# Patient Record
Sex: MALE | Race: WHITE | NOT HISPANIC OR LATINO | Employment: OTHER | URBAN - METROPOLITAN AREA
[De-identification: names, ages, dates, MRNs, and addresses within clinical notes are randomized per-mention and may not be internally consistent; named-entity substitution may affect disease eponyms.]

---

## 2017-01-30 ENCOUNTER — OFFICE VISIT (OUTPATIENT)
Dept: INTERNAL MEDICINE | Facility: CLINIC | Age: 52
End: 2017-01-30
Payer: MEDICARE

## 2017-01-30 VITALS
HEIGHT: 72 IN | HEART RATE: 87 BPM | OXYGEN SATURATION: 96 % | DIASTOLIC BLOOD PRESSURE: 76 MMHG | SYSTOLIC BLOOD PRESSURE: 120 MMHG | BODY MASS INDEX: 30.28 KG/M2 | TEMPERATURE: 97 F | WEIGHT: 223.56 LBS

## 2017-01-30 DIAGNOSIS — I25.119 CORONARY ARTERY DISEASE INVOLVING NATIVE CORONARY ARTERY OF NATIVE HEART WITH ANGINA PECTORIS: ICD-10-CM

## 2017-01-30 DIAGNOSIS — Z00.00 PREVENTATIVE HEALTH CARE: ICD-10-CM

## 2017-01-30 DIAGNOSIS — R42 DIZZINESS: Primary | ICD-10-CM

## 2017-01-30 DIAGNOSIS — Z12.5 ENCOUNTER FOR SCREENING FOR MALIGNANT NEOPLASM OF PROSTATE: ICD-10-CM

## 2017-01-30 DIAGNOSIS — Z11.59 NEED FOR HEPATITIS C SCREENING TEST: ICD-10-CM

## 2017-01-30 DIAGNOSIS — F31.9 BIPOLAR AFFECTIVE DISORDER, REMISSION STATUS UNSPECIFIED: ICD-10-CM

## 2017-01-30 PROBLEM — T85.528A: Status: ACTIVE | Noted: 2017-01-30

## 2017-01-30 PROCEDURE — 99999 PR PBB SHADOW E&M-NEW PATIENT-LVL III: CPT | Mod: PBBFAC,,, | Performed by: INTERNAL MEDICINE

## 2017-01-30 PROCEDURE — 99204 OFFICE O/P NEW MOD 45 MIN: CPT | Mod: S$GLB,,, | Performed by: INTERNAL MEDICINE

## 2017-01-30 RX ORDER — LURASIDONE HYDROCHLORIDE 40 MG/1
1 TABLET, FILM COATED ORAL NIGHTLY
COMMUNITY
Start: 2017-01-23 | End: 2021-09-03

## 2017-01-30 RX ORDER — CLOPIDOGREL BISULFATE 75 MG/1
1 TABLET ORAL DAILY
COMMUNITY
Start: 2016-12-22

## 2017-01-30 RX ORDER — METOPROLOL TARTRATE 25 MG/1
1 TABLET, FILM COATED ORAL 2 TIMES DAILY
COMMUNITY
Start: 2016-12-22

## 2017-01-30 RX ORDER — FAMOTIDINE 20 MG/1
TABLET, FILM COATED ORAL
COMMUNITY
Start: 2016-11-23 | End: 2017-01-30

## 2017-01-30 RX ORDER — VORTIOXETINE 20 MG/1
1 TABLET, FILM COATED ORAL DAILY
COMMUNITY
Start: 2016-12-26

## 2017-01-30 RX ORDER — ASPIRIN 81 MG/1
81 TABLET ORAL DAILY
COMMUNITY

## 2017-01-30 RX ORDER — DICLOFENAC SODIUM 10 MG/G
GEL TOPICAL
COMMUNITY
Start: 2016-11-23 | End: 2017-01-30

## 2017-01-30 RX ORDER — IBUPROFEN 800 MG/1
TABLET ORAL
COMMUNITY
Start: 2016-11-23 | End: 2017-01-30

## 2017-01-30 RX ORDER — TRAMADOL HYDROCHLORIDE 50 MG/1
1 TABLET ORAL 2 TIMES DAILY
COMMUNITY
Start: 2017-01-16 | End: 2017-09-05

## 2017-01-30 RX ORDER — TRIHEXYPHENIDYL HYDROCHLORIDE 2 MG/1
1 TABLET ORAL NIGHTLY
COMMUNITY
Start: 2017-01-26 | End: 2021-09-03

## 2017-01-30 RX ORDER — TRAZODONE HYDROCHLORIDE 100 MG/1
25 TABLET ORAL NIGHTLY
COMMUNITY
Start: 2016-12-21 | End: 2021-09-03

## 2017-01-30 RX ORDER — MECLIZINE HYDROCHLORIDE 25 MG/1
25 TABLET ORAL EVERY 6 HOURS PRN
Qty: 30 TABLET | Refills: 0 | Status: SHIPPED | OUTPATIENT
Start: 2017-01-30 | End: 2021-09-03

## 2017-01-30 RX ORDER — ISOSORBIDE MONONITRATE 30 MG/1
1 TABLET, EXTENDED RELEASE ORAL DAILY
COMMUNITY
Start: 2016-12-22 | End: 2021-09-03

## 2017-01-30 NOTE — MR AVS SNAPSHOT
[facility]  [address] [facility] [place] [address]  Phone: [phone]  Fax: [phone]                  Huma Ronquillo   2017 4:40 PM   Office Visit    Description:  Male : 1965   Provider:  Bruno Marquis MD   Department:  [facility]           Reason for Visit     Dizziness           Diagnoses this Visit        Comments    Dizziness    -  Primary     Bipolar affective disorder, remission status unspecified         Coronary artery disease involving native coronary artery of native heart with angina pectoris         Preventative health care         Need for hepatitis C screening test         Encounter for screening for malignant neoplasm of prostate                To Do List           Future Appointments        Provider Department Dept Phone    2017 8:00 AM LABORATORY, [facility] (5 Years of Data)     None      Follow-Up and Disposition     Return if symptoms worsen or fail to improve.       These Medications        Disp Refills Start End    meclizine (ANTIVERT) 25 mg tablet 30 tablet 0 2017    Take 1 tablet (25 mg total) by mouth every 6 (six) hours as needed for Dizziness or Nausea. - Oral    Pharmacy: [id number] - [facility] [address] AT [facility] [address] Ph #: [phone]         [facility]     [facility] On Call Nurse Care Line -  Assistance  Registered nurses in the [facility] On Call Center provide clinical advisement, health education, appointment booking, and other advisory services.  Call for this free service at [phone].             Medications           Message regarding Medications     Verify the changes and/or additions to your medication regime listed below are the same as discussed with your clinician today.  If any of these changes or additions are incorrect, please notify your healthcare provider.        START taking these NEW medications         Refills    meclizine (ANTIVERT) 25 mg tablet 0    Sig: Take 1 tablet (25 mg total) by mouth every 6 (six) hours as needed for Dizziness or Nausea.    Class: Normal    Route: Oral      STOP taking these medications     famotidine (PEPCID) 20 MG tablet     VOLTAREN 1 % Gel     ibuprofen (ADVIL,MOTRIN) 800 MG tablet            Verify that the below list of medications is an accurate representation of the medications you are currently taking.  If none reported, the list may be blank. If incorrect, please contact your healthcare provider. Carry this list with you in case of emergency.           Current Medications     aspirin (ECOTRIN) 81 MG EC tablet Take 81 mg by mouth once daily.    clopidogrel (PLAVIX) 75 mg tablet Take 1 tablet by mouth once daily.    isosorbide mononitrate (IMDUR) 30 MG 24 hr tablet Take 1 tablet by mouth once daily.    LATUDA 40 mg Tab tablet Take 1 tablet by mouth every evening.    meclizine (ANTIVERT) 25 mg tablet Take 1 tablet (25 mg total) by mouth every 6 (six) hours as needed for Dizziness or Nausea.    metoprolol tartrate (LOPRESSOR) 25 MG tablet Take 1 tablet by mouth 2 (two) times daily.    tramadol (ULTRAM) 50 mg tablet Take 1 tablet by mouth 2 (two) times daily.    trazodone (DESYREL) 100 MG tablet Take 25 mg by mouth every evening.    trihexyphenidyl (ARTANE) 2 MG tablet Take 1 tablet by mouth every evening.    TRINTELLIX 20 mg Tab Take 1 tablet by mouth once daily.           Clinical Reference Information           Vital Signs - Last Recorded  Most recent update: 1/30/2017  4:26 PM by Ofe Knight MA    BP Pulse Temp Ht Wt SpO2    120/76 (BP Location: Right arm, Patient Position: Sitting) 87 96.9 °F (36.1 °C) (Tympanic) 6' (1.829 m) 101.4 kg (223 lb 8.7 oz) 96%    BMI                30.32 kg/m2          Blood Pressure          Most Recent Value    BP  120/76      Allergies as of 1/30/2017     Benadryl [Diphenhydramine Hcl]    Codeine      Immunizations Administered on Date of  Encounter - 1/30/2017     None      Orders Placed During Today's Visit     Future Labs/Procedures Expected by Expires    CBC auto differential  1/30/2017 3/31/2018    Comprehensive metabolic panel  1/30/2017 3/31/2018    Hepatitis C antibody  1/30/2017 3/31/2018    Lipid panel  1/30/2017 3/31/2018    PSA, Screening  1/30/2017 3/31/2018      [facility] Sign-Up     Activating your [facility] account is as easy as 1-2-3!     1) Visit [email or web address], select Sign Up Now, enter this activation code and your date of birth, then select Next.  [email or web address]  Expires: 3/16/2017  5:03 PM      2) Create a username and password to use when you visit [facility] in the future and select a security question in case you lose your password and select Next.    3) Enter your e-mail address and click Sign Up!    Additional Information  If you have questions, please e-mail [email or web address] or call [phone] to talk to our [facility] staff. Remember, [facility] is NOT to be used for urgent needs. For medical emergencies, dial 911.

## 2017-01-30 NOTE — PROGRESS NOTES
Subjective:      Patient ID: Huma Ronquillo is a 51 y.o. male.    Chief Complaint: Dizziness (x 1 week)    HPI Comments: 52 yo with Past Medical History:    Bipolar disorder                                              Cardiovascular disease                                        Depression       Here today c/o dizziness. Pt also requests check up labs.     Cards: leroy       Past Surgical History:    ROTATOR CUFF REPAIR                             Right 2013          TIBIA FRACTURE SURGERY                          Left               COLONOSCOPY                                      2016          Social History    Marital status:              Spouse name:                       Years of education:                 Number of children:               Occupational History    None on file    Social History Main Topics    Smoking status: Never Smoker                                                                Smokeless status: Former User                       Alcohol use: No              Drug use: No              Sexual activity: Not on file          Other Topics            Concern    None on file    Social History Narrative    None on file        family history includes Cancer in his mother; Heart attack in his father.                                                        Dizziness:   Chronicity:  New  Onset:  In the past 7 days  Progression since onset:  Unchanged  Frequency:  Every few hours  Severity:  Moderate  Duration:  1 minute  Dizziness characteristics:  Spinning inside head only  Frequency of Spells:  Daily   Associated symptoms: light-headedness.no hearing loss, no fever, no tinnitus, no nausea, no vomiting, no diaphoresis, no weakness, no visual disturbances, no syncope, no palpitations, no facial weakness, no slurred speech, no numbness in extremities and no chest pain.  Aggravated by:  Getting up and position changes  Treatments tried:  Nothing  Improvements on treatment:  No relief   PMH  includes: anxiety.no strokes, no neurologic disease, no head trauma and no head trauma.    Review of Systems   Constitutional: Negative for diaphoresis and fever.   HENT: Negative for hearing loss and tinnitus.    Cardiovascular: Negative for chest pain, palpitations and syncope.   Gastrointestinal: Negative for nausea and vomiting.   Neurological: Positive for dizziness and light-headedness. Negative for weakness.     Objective:     Visit Vitals    /76 (BP Location: Right arm, Patient Position: Sitting)    Pulse 87    Temp 96.9 °F (36.1 °C) (Tympanic)    Ht 6' (1.829 m)    Wt 101.4 kg (223 lb 8.7 oz)    SpO2 96%    BMI 30.32 kg/m2       Physical Exam   Constitutional: He is oriented to person, place, and time. He appears well-developed and well-nourished. No distress.   HENT:   Head: Normocephalic and atraumatic.   Right Ear: Tympanic membrane normal.   Left Ear: Tympanic membrane normal.   Mouth/Throat: Oropharynx is clear and moist.   Eyes: EOM are normal. Pupils are equal, round, and reactive to light.   Neck: Neck supple. No thyromegaly present.   Cardiovascular: Normal rate and regular rhythm.    Pulmonary/Chest: Breath sounds normal. He has no wheezes. He has no rales.   Abdominal: Soft. Bowel sounds are normal. There is no tenderness.   Musculoskeletal: He exhibits no edema.   Lymphadenopathy:     He has no cervical adenopathy.   Neurological: He is alert and oriented to person, place, and time. He displays normal reflexes. No cranial nerve deficit. Coordination normal.   Symptoms reproduced with Shaquille-Hallpike maneuver, worse on left.   Skin: Skin is warm and dry.   Psychiatric: He has a normal mood and affect. His behavior is normal.       Assessment:     1. Dizziness    2. Bipolar affective disorder, remission status unspecified    3. Coronary artery disease involving native coronary artery of native heart with angina pectoris    4. Preventative health care    5. Need for hepatitis C screening  test    6. Encounter for screening for malignant neoplasm of prostate       Plan:   Dizziness  Denies new meds and truama  -     Orthostatic blood pressures normal, see chart for results    Bipolar affective disorder, remission status unspecified  Reports stable  Cont meds and f/u as per psyc    Coronary artery disease involving native coronary artery of native heart with angina pectoris  -     Lipid panel; Future; Expected date: 1/30/17  -     CBC auto differential; Future; Expected date: 1/30/17  -     Comprehensive metabolic panel; Future; Expected date: 1/30/17    Preventative health care  -     PSA, Screening; Future; Expected date: 1/30/17    Need for hepatitis C screening test  -     Hepatitis C antibody; Future; Expected date: 1/30/17    Encounter for screening for malignant neoplasm of prostate   -     PSA, Screening; Future; Expected date: 1/30/17    Other orders  -     meclizine (ANTIVERT) 25 mg tablet; Take 1 tablet (25 mg total) by mouth every 6 (six) hours as needed for Dizziness or Nausea.  Dispense: 30 tablet; Refill: 0    vertigo exercises as discussed  Stay hydrated        Return in about 4 weeks (around 2/27/2017), or if symptoms worsen or fail to improve.

## 2017-02-01 ENCOUNTER — LAB VISIT (OUTPATIENT)
Dept: LAB | Facility: HOSPITAL | Age: 52
End: 2017-02-01
Attending: INTERNAL MEDICINE
Payer: MEDICARE

## 2017-02-01 DIAGNOSIS — Z12.5 ENCOUNTER FOR SCREENING FOR MALIGNANT NEOPLASM OF PROSTATE: ICD-10-CM

## 2017-02-01 DIAGNOSIS — I25.119 CORONARY ARTERY DISEASE INVOLVING NATIVE CORONARY ARTERY OF NATIVE HEART WITH ANGINA PECTORIS: ICD-10-CM

## 2017-02-01 DIAGNOSIS — Z11.59 NEED FOR HEPATITIS C SCREENING TEST: ICD-10-CM

## 2017-02-01 DIAGNOSIS — Z00.00 PREVENTATIVE HEALTH CARE: ICD-10-CM

## 2017-02-01 LAB
ALBUMIN SERPL BCP-MCNC: 4.1 G/DL
ALP SERPL-CCNC: 79 U/L
ALT SERPL W/O P-5'-P-CCNC: 75 U/L
ANION GAP SERPL CALC-SCNC: 10 MMOL/L
AST SERPL-CCNC: 61 U/L
BASOPHILS # BLD AUTO: 0.03 K/UL
BASOPHILS NFR BLD: 0.6 %
BILIRUB SERPL-MCNC: 0.8 MG/DL
BUN SERPL-MCNC: 10 MG/DL
CALCIUM SERPL-MCNC: 10 MG/DL
CHLORIDE SERPL-SCNC: 104 MMOL/L
CHOLEST/HDLC SERPL: 4.4 {RATIO}
CO2 SERPL-SCNC: 24 MMOL/L
COMPLEXED PSA SERPL-MCNC: 0.75 NG/ML
CREAT SERPL-MCNC: 1.1 MG/DL
DIFFERENTIAL METHOD: ABNORMAL
EOSINOPHIL # BLD AUTO: 0.1 K/UL
EOSINOPHIL NFR BLD: 2.4 %
ERYTHROCYTE [DISTWIDTH] IN BLOOD BY AUTOMATED COUNT: 13.1 %
EST. GFR  (AFRICAN AMERICAN): >60 ML/MIN/1.73 M^2
EST. GFR  (NON AFRICAN AMERICAN): >60 ML/MIN/1.73 M^2
GLUCOSE SERPL-MCNC: 108 MG/DL
HCT VFR BLD AUTO: 47.2 %
HDL/CHOLESTEROL RATIO: 22.7 %
HDLC SERPL-MCNC: 194 MG/DL
HDLC SERPL-MCNC: 44 MG/DL
HGB BLD-MCNC: 16.8 G/DL
LDLC SERPL CALC-MCNC: 125.6 MG/DL
LYMPHOCYTES # BLD AUTO: 1.8 K/UL
LYMPHOCYTES NFR BLD: 35.5 %
MCH RBC QN AUTO: 32.9 PG
MCHC RBC AUTO-ENTMCNC: 35.6 %
MCV RBC AUTO: 92 FL
MONOCYTES # BLD AUTO: 0.6 K/UL
MONOCYTES NFR BLD: 11.8 %
NEUTROPHILS # BLD AUTO: 2.4 K/UL
NEUTROPHILS NFR BLD: 49.5 %
NONHDLC SERPL-MCNC: 150 MG/DL
PLATELET # BLD AUTO: 206 K/UL
PMV BLD AUTO: 10.1 FL
POTASSIUM SERPL-SCNC: 4.8 MMOL/L
PROT SERPL-MCNC: 7.8 G/DL
RBC # BLD AUTO: 5.11 M/UL
SODIUM SERPL-SCNC: 138 MMOL/L
TRIGL SERPL-MCNC: 122 MG/DL
WBC # BLD AUTO: 4.93 K/UL

## 2017-02-01 PROCEDURE — 36415 COLL VENOUS BLD VENIPUNCTURE: CPT | Mod: PO

## 2017-02-01 PROCEDURE — 84153 ASSAY OF PSA TOTAL: CPT

## 2017-02-01 PROCEDURE — 80061 LIPID PANEL: CPT

## 2017-02-01 PROCEDURE — 80053 COMPREHEN METABOLIC PANEL: CPT

## 2017-02-01 PROCEDURE — 86803 HEPATITIS C AB TEST: CPT

## 2017-02-01 PROCEDURE — 85025 COMPLETE CBC W/AUTO DIFF WBC: CPT

## 2017-02-02 LAB — HCV AB SERPL QL IA: POSITIVE

## 2017-02-03 ENCOUNTER — TELEPHONE (OUTPATIENT)
Dept: INTERNAL MEDICINE | Facility: CLINIC | Age: 52
End: 2017-02-03

## 2017-02-03 ENCOUNTER — PATIENT MESSAGE (OUTPATIENT)
Dept: INTERNAL MEDICINE | Facility: CLINIC | Age: 52
End: 2017-02-03

## 2017-02-03 DIAGNOSIS — R76.8 HEPATITIS C ANTIBODY TEST POSITIVE: ICD-10-CM

## 2017-02-03 DIAGNOSIS — R74.8 ELEVATED LIVER ENZYMES: Primary | ICD-10-CM

## 2017-02-03 NOTE — TELEPHONE ENCOUNTER
Labs are ok except liver enzymes are mildly elevated and hep c ab pos. Needs hep c RNA to see if he truly has hep c and also needs liver ultrasound.

## 2017-02-03 NOTE — TELEPHONE ENCOUNTER
Notified pt and his wife that liver enzymes were mildly elevated and hep c ab was positive with recommendation for liver ultrasound and additional testing for hep c.  Pt stated he has a gastroenterologist, Dr. Sameer Woodruff, who checks all this for him and does not want any of the tests scheduled here.

## 2017-02-06 ENCOUNTER — PATIENT MESSAGE (OUTPATIENT)
Dept: INTERNAL MEDICINE | Facility: CLINIC | Age: 52
End: 2017-02-06

## 2017-02-09 ENCOUNTER — HOSPITAL ENCOUNTER (OUTPATIENT)
Dept: RADIOLOGY | Facility: HOSPITAL | Age: 52
Discharge: HOME | End: 2017-02-09
Attending: INTERNAL MEDICINE
Payer: MEDICARE

## 2017-02-09 DIAGNOSIS — R74.8 ELEVATED LIVER ENZYMES: ICD-10-CM

## 2017-02-09 PROCEDURE — 76705 ECHO EXAM OF ABDOMEN: CPT | Mod: 26,,, | Performed by: RADIOLOGY

## 2017-02-09 PROCEDURE — 76705 ECHO EXAM OF ABDOMEN: CPT | Mod: TC,PO

## 2017-02-13 ENCOUNTER — OFFICE VISIT (OUTPATIENT)
Dept: INTERNAL MEDICINE | Facility: CLINIC | Age: 52
End: 2017-02-13
Payer: MEDICARE

## 2017-02-13 VITALS
WEIGHT: 226.63 LBS | DIASTOLIC BLOOD PRESSURE: 82 MMHG | SYSTOLIC BLOOD PRESSURE: 124 MMHG | OXYGEN SATURATION: 96 % | HEIGHT: 72 IN | BODY MASS INDEX: 30.7 KG/M2 | HEART RATE: 80 BPM | TEMPERATURE: 97 F

## 2017-02-13 DIAGNOSIS — B18.2 CHRONIC HEPATITIS C WITHOUT HEPATIC COMA: ICD-10-CM

## 2017-02-13 DIAGNOSIS — I25.119 CORONARY ARTERY DISEASE INVOLVING NATIVE CORONARY ARTERY OF NATIVE HEART WITH ANGINA PECTORIS: ICD-10-CM

## 2017-02-13 DIAGNOSIS — Z78.9 STATIN INTOLERANCE: ICD-10-CM

## 2017-02-13 DIAGNOSIS — F31.9 BIPOLAR AFFECTIVE DISORDER, REMISSION STATUS UNSPECIFIED: ICD-10-CM

## 2017-02-13 DIAGNOSIS — Z00.00 ROUTINE GENERAL MEDICAL EXAMINATION AT A HEALTH CARE FACILITY: Primary | ICD-10-CM

## 2017-02-13 DIAGNOSIS — E66.9 OBESITY (BMI 30.0-34.9): ICD-10-CM

## 2017-02-13 PROCEDURE — 99396 PREV VISIT EST AGE 40-64: CPT | Mod: S$GLB,,, | Performed by: INTERNAL MEDICINE

## 2017-02-13 PROCEDURE — 99999 PR PBB SHADOW E&M-EST. PATIENT-LVL III: CPT | Mod: PBBFAC,,, | Performed by: INTERNAL MEDICINE

## 2017-02-13 NOTE — PROGRESS NOTES
Subjective:      Patient ID: Huma Ronquillo is a 51 y.o. male.    Chief Complaint: Follow-up    HPI Comments: 52 yo with Patient Active Problem List:     Bipolar disorder     Coronary artery disease involving native coronary artery of native heart with angina pectoris     Statin intolerance    Here today for annual prevent exam.  He is feeling well today and in his usual state of health.  He reports that his dizziness has resolved since his last visit.  Never smoked.  He reports diagnosis of hepatitis C without treatment approximately 5 years ago.  He reports history of statin intolerance due to elevated liver enzymes.    Review of Systems   Constitutional: Negative for chills and fever.   HENT: Negative for ear pain and sore throat.    Respiratory: Negative for cough.    Cardiovascular: Negative for chest pain.   Gastrointestinal: Negative for abdominal pain and blood in stool.   Genitourinary: Negative for dysuria and hematuria.   Neurological: Negative for seizures and syncope.     Objective:     Visit Vitals    /82 (BP Location: Right arm, Patient Position: Sitting)    Pulse 80    Temp 97.4 °F (36.3 °C) (Tympanic)    Ht 6' (1.829 m)    Wt 102.8 kg (226 lb 10.1 oz)    SpO2 96%    BMI 30.74 kg/m2       Physical Exam   Constitutional: He is oriented to person, place, and time. He appears well-developed and well-nourished. No distress.   HENT:   Head: Normocephalic and atraumatic.   Mouth/Throat: Oropharynx is clear and moist.   Eyes: EOM are normal. Pupils are equal, round, and reactive to light.   Neck: Neck supple. No thyromegaly present.   Cardiovascular: Normal rate and regular rhythm.    Pulmonary/Chest: Breath sounds normal. He has no wheezes. He has no rales.   Abdominal: Soft. Bowel sounds are normal. There is no tenderness.   Musculoskeletal: He exhibits no edema.   Lymphadenopathy:     He has no cervical adenopathy.   Neurological: He is alert and oriented to person, place, and time.   Skin:  Skin is warm and dry.   Psychiatric: He has a normal mood and affect. His behavior is normal.     Lab Visit on 02/09/2017   Component Date Value Ref Range Status    HCV Log 02/09/2017 5.64* <1.08 Log (10) IU/mL Final    Comment: This procedure utilizes a real-time polymerase chain   reaction test from [employer].  The amplification   target is a conserved region of the HCV genome.  The lower  limit of quantitation is 12 IU/mL (1.08 Log IU/mL) and the   upper limit of quantitation is 100 million IU/mL (8.00 Log  IU/mL). The qualitative limit of detection is 12 IU/mL   (1.08 Log IU/mL).  Specimens reported as DETECTED but <12 IU/mL contain   detectable levels of hepatitis C RNA but the viral load is  below the limit of quantitation.  A Not detected result  does not rule out infection.  Test performed at [facility],  [facility] [place]  [address]     [phone]  Akhil Sousa MD  - Medical Director      HCV, Qualitative 02/09/2017 DETECTED* Not detected IU/mL Final    HCV RNA Quant PCR 02/09/2017 433,402* <12 IU/mL Final   Lab Visit on 02/01/2017   Component Date Value Ref Range Status    PSA, SCREEN 02/01/2017 0.75  0.00 - 4.00 ng/mL Final    Comment: PSA Expected levels:  Hormonal Therapy: <0.05 ng/ml  Prostatectomy: <0.01 ng/ml  Radiation Therapy: <1.00 ng/ml      Cholesterol 02/01/2017 194  120 - 199 mg/dL Final    Comment: The National Cholesterol Education Program (NCEP) has set the  following guidelines (reference ranges) for Cholesterol:  Optimal.....................<200 mg/dL  Borderline High.............200-239 mg/dL  High........................> or = 240 mg/dL      Triglycerides 02/01/2017 122  30 - 150 mg/dL Final    Comment: The National Cholesterol Education Program (NCEP) has set the  following guidelines (reference values) for triglycerides:  Normal......................<150 mg/dL  Borderline High.............150-199 mg/dL  High........................200-499  mg/dL      HDL 02/01/2017 44  40 - 75 mg/dL Final    Comment: The National Cholesterol Education Program (NCEP) has set the  following guidelines (reference values) for HDL Cholesterol:  Low...............<40 mg/dL  Optimal...........>60 mg/dL      LDL Cholesterol 02/01/2017 125.6  63.0 - 159.0 mg/dL Final    Comment: The National Cholesterol Education Program (NCEP) has set the  following guidelines (reference values) for LDL Cholesterol:  Optimal.......................<130 mg/dL  Borderline High...............130-159 mg/dL  High..........................160-189 mg/dL  Very High.....................>190 mg/dL      HDL/Chol Ratio 02/01/2017 22.7  20.0 - 50.0 % Final    Total Cholesterol/HDL Ratio 02/01/2017 4.4  2.0 - 5.0 Final    Non-HDL Cholesterol 02/01/2017 150  mg/dL Final    Comment: Risk category and Non-HDL cholesterol goals:  Coronary heart disease (CHD)or equivalent (10-year risk of CHD >20%):  Non-HDL cholesterol goal     <130 mg/dL  Two or more CHD risk factors and 10-year risk of CHD <= 20%:  Non-HDL cholesterol goal     <160 mg/dL  0 to 1 CHD risk factor:  Non-HDL cholesterol goal     <190 mg/dL      Hepatitis C Ab 02/01/2017 Positive*  Final    WBC 02/01/2017 4.93  3.90 - 12.70 K/uL Final    RBC 02/01/2017 5.11  4.60 - 6.20 M/uL Final    Hemoglobin 02/01/2017 16.8  14.0 - 18.0 g/dL Final    Hematocrit 02/01/2017 47.2  40.0 - 54.0 % Final    MCV 02/01/2017 92  82 - 98 fL Final    MCH 02/01/2017 32.9* 27.0 - 31.0 pg Final    MCHC 02/01/2017 35.6  32.0 - 36.0 % Final    RDW 02/01/2017 13.1  11.5 - 14.5 % Final    Platelets 02/01/2017 206  150 - 350 K/uL Final    MPV 02/01/2017 10.1  9.2 - 12.9 fL Final    Gran # 02/01/2017 2.4  1.8 - 7.7 K/uL Final    Lymph # 02/01/2017 1.8  1.0 - 4.8 K/uL Final    Mono # 02/01/2017 0.6  0.3 - 1.0 K/uL Final    Eos # 02/01/2017 0.1  0.0 - 0.5 K/uL Final    Baso # 02/01/2017 0.03  0.00 - 0.20 K/uL Final    Gran% 02/01/2017 49.5  38.0 - 73.0 % Final     Lymph% 02/01/2017 35.5  18.0 - 48.0 % Final    Mono% 02/01/2017 11.8  4.0 - 15.0 % Final    Eosinophil% 02/01/2017 2.4  0.0 - 8.0 % Final    Basophil% 02/01/2017 0.6  0.0 - 1.9 % Final    Differential Method 02/01/2017 Automated   Final    Sodium 02/01/2017 138  136 - 145 mmol/L Final    Potassium 02/01/2017 4.8  3.5 - 5.1 mmol/L Final    Chloride 02/01/2017 104  95 - 110 mmol/L Final    CO2 02/01/2017 24  23 - 29 mmol/L Final    Glucose 02/01/2017 108  70 - 110 mg/dL Final    BUN, Bld 02/01/2017 10  6 - 20 mg/dL Final    Creatinine 02/01/2017 1.1  0.5 - 1.4 mg/dL Final    Calcium 02/01/2017 10.0  8.7 - 10.5 mg/dL Final    Total Protein 02/01/2017 7.8  6.0 - 8.4 g/dL Final    Albumin 02/01/2017 4.1  3.5 - 5.2 g/dL Final    Total Bilirubin 02/01/2017 0.8  0.1 - 1.0 mg/dL Final    Comment: For infants and newborns, interpretation of results should be based  on gestational age, weight and in agreement with clinical  observations.  Premature Infant recommended reference ranges:  Up to 24 hours.............<8.0 mg/dL  Up to 48 hours............<12.0 mg/dL  3-5 days..................<15.0 mg/dL  6-29 days.................<15.0 mg/dL      Alkaline Phosphatase 02/01/2017 79  55 - 135 U/L Final    AST 02/01/2017 61* 10 - 40 U/L Final    ALT 02/01/2017 75* 10 - 44 U/L Final    Anion Gap 02/01/2017 10  8 - 16 mmol/L Final    eGFR if African American 02/01/2017 >60.0  >60 mL/min/1.73 m^2 Final    eGFR if non African American 02/01/2017 >60.0  >60 mL/min/1.73 m^2 Final    Comment: Calculation used to obtain the estimated glomerular filtration  rate (eGFR) is the CKD-EPI equation. Since race is unknown   in our information system, the eGFR values for   -American and Non--American patients are given   for each creatinine result.         Assessment:     1. Routine general medical examination at a health care facility    2. Chronic hepatitis C without hepatic coma    3. Statin intolerance    4.  Bipolar affective disorder, remission status unspecified    5. Coronary artery disease involving native coronary artery of native heart with angina pectoris    6. Obesity (BMI 30.0-34.9)      Plan:   Routine general medical examination at a health care facility  Heart healthy diet and regular exercise  Health maintenance card reviewed with patient    Chronic hepatitis C without hepatic coma  -     Cancel: Ambulatory Referral to Hepatology  -     Ambulatory referral to Gastroenterology    Statin intolerance  Patient declines that he and states will talk to his cardiologists further about cholesterol control    Bipolar affective disorder, remission status unspecified    Coronary artery disease involving native coronary artery of native heart with angina pectoris  Stable  Continue meds and follow-up as per cardiology    Obesity (BMI 30.0-34.9)              Return in about 1 year (around 2/13/2018), or if symptoms worsen or fail to improve.

## 2017-02-13 NOTE — MR AVS SNAPSHOT
[facility] - [facility] Medicine  [address] [facility] [place] [address]  Phone: [phone]  Fax: [phone]                  Huma Ronquillo   2017 2:40 PM   Office Visit    Description:  Male : 1965   Provider:  Bruno Marquis MD   Department:  [facility] - Internal Medicine           Reason for Visit     Follow-up           Diagnoses this Visit        Comments    Routine general medical examination at a health care facility    -  Primary     Chronic hepatitis C without hepatic coma         Statin intolerance         Bipolar affective disorder, remission status unspecified         Coronary artery disease involving native coronary artery of native heart with angina pectoris         Obesity (BMI 30.0-34.9)                To Do List           Goals (5 Years of Data)     None      Follow-Up and Disposition     Return in about 1 year (around 2018), or if symptoms worsen or fail to improve.      [facility] On Call     [facility] On Call Nurse Care Line -  Assistance  Registered nurses in the [facility] On Call Center provide clinical advisement, health education, appointment booking, and other advisory services.  Call for this free service at [phone].             Medications           Message regarding Medications     Verify the changes and/or additions to your medication regime listed below are the same as discussed with your clinician today.  If any of these changes or additions are incorrect, please notify your healthcare provider.             Verify that the below list of medications is an accurate representation of the medications you are currently taking.  If none reported, the list may be blank. If incorrect, please contact your healthcare provider. Carry this list with you in case of emergency.           Current Medications     aspirin (ECOTRIN) 81 MG EC tablet Take 81 mg by mouth once daily.    clopidogrel (PLAVIX) 75 mg tablet Take 1 tablet by mouth once daily.    isosorbide mononitrate  (IMDUR) 30 MG 24 hr tablet Take 1 tablet by mouth once daily.    LATUDA 40 mg Tab tablet Take 1 tablet by mouth every evening.    meclizine (ANTIVERT) 25 mg tablet Take 1 tablet (25 mg total) by mouth every 6 (six) hours as needed for Dizziness or Nausea.    metoprolol tartrate (LOPRESSOR) 25 MG tablet Take 1 tablet by mouth 2 (two) times daily.    tramadol (ULTRAM) 50 mg tablet Take 1 tablet by mouth 2 (two) times daily.    trazodone (DESYREL) 100 MG tablet Take 25 mg by mouth every evening.    trihexyphenidyl (ARTANE) 2 MG tablet Take 1 tablet by mouth every evening.    TRINTELLIX 20 mg Tab Take 1 tablet by mouth once daily.           Clinical Reference Information           Your Vitals Were     BP Pulse Temp Height Weight SpO2    124/82 (BP Location: Right arm, Patient Position: Sitting) 80 97.4 °F (36.3 °C) (Tympanic) 6' (1.829 m) 102.8 kg (226 lb 10.1 oz) 96%    BMI                30.74 kg/m2          Blood Pressure          Most Recent Value    BP  124/82      Allergies as of 2/13/2017     Benadryl [Diphenhydramine Hcl]    Codeine      Immunizations Administered on Date of Encounter - 2/13/2017     None      Orders Placed During Today's Visit      Normal Orders This Visit    Ambulatory referral to Gastroenterology       Language Assistance Services     ATTENTION: Language assistance services are available, free of charge. Please call [phone].      ATENCIÓN: Si habla pilar, tiene a boudreaux disposición servicios gratuitos de asistencia lingüística. Llame al [phone].     [place] Ý: N?u b?n nói Ti?ng Vi?t, có các d?ch v? h? tr? ngôn ng? mi?n phí dành cho b?n. G?i s? [phone].         [facility] complies with applicable Federal civil rights laws and does not discriminate on the basis of race, color, national origin, age, disability, or sex.

## 2017-02-17 ENCOUNTER — TELEPHONE (OUTPATIENT)
Dept: GASTROENTEROLOGY | Facility: CLINIC | Age: 52
End: 2017-02-17

## 2017-02-17 NOTE — TELEPHONE ENCOUNTER
----- Message from Maya Schaeffer sent at 2/17/2017 12:40 PM CST -----  Contact: Ira/wife  Ira request a call back at [phone], Regards to an appointment.    Thanks  Td

## 2017-02-23 ENCOUNTER — LAB VISIT (OUTPATIENT)
Dept: LAB | Facility: HOSPITAL | Age: 52
End: 2017-02-23
Attending: NURSE PRACTITIONER
Payer: MEDICARE

## 2017-02-23 ENCOUNTER — OFFICE VISIT (OUTPATIENT)
Dept: GASTROENTEROLOGY | Facility: CLINIC | Age: 52
End: 2017-02-23
Payer: MEDICARE

## 2017-02-23 VITALS
BODY MASS INDEX: 30.61 KG/M2 | HEART RATE: 84 BPM | SYSTOLIC BLOOD PRESSURE: 120 MMHG | DIASTOLIC BLOOD PRESSURE: 86 MMHG | WEIGHT: 226 LBS | HEIGHT: 72 IN

## 2017-02-23 DIAGNOSIS — R79.89 ELEVATED LFTS: ICD-10-CM

## 2017-02-23 DIAGNOSIS — B18.2 CHRONIC HEPATITIS C WITHOUT HEPATIC COMA: Primary | ICD-10-CM

## 2017-02-23 DIAGNOSIS — Z11.4 ENCOUNTER FOR SCREENING FOR HIV: ICD-10-CM

## 2017-02-23 DIAGNOSIS — B18.2 CHRONIC HEPATITIS C WITHOUT HEPATIC COMA: ICD-10-CM

## 2017-02-23 LAB
AMPHET+METHAMPHET UR QL: NEGATIVE
BARBITURATES UR QL SCN>200 NG/ML: NEGATIVE
BENZODIAZ UR QL SCN>200 NG/ML: NEGATIVE
BZE UR QL SCN: NEGATIVE
CANNABINOIDS UR QL SCN: NEGATIVE
CREAT UR-MCNC: 149 MG/DL
ETHANOL UR-MCNC: <10 MG/DL
METHADONE UR QL SCN>300 NG/ML: NEGATIVE
OPIATES UR QL SCN: NEGATIVE
PCP UR QL SCN>25 NG/ML: NEGATIVE
TOXICOLOGY INFORMATION: NORMAL

## 2017-02-23 PROCEDURE — 86038 ANTINUCLEAR ANTIBODIES: CPT

## 2017-02-23 PROCEDURE — 82103 ALPHA-1-ANTITRYPSIN TOTAL: CPT

## 2017-02-23 PROCEDURE — 86706 HEP B SURFACE ANTIBODY: CPT

## 2017-02-23 PROCEDURE — 1160F RVW MEDS BY RX/DR IN RCRD: CPT | Mod: S$GLB,,, | Performed by: NURSE PRACTITIONER

## 2017-02-23 PROCEDURE — 82247 BILIRUBIN TOTAL: CPT

## 2017-02-23 PROCEDURE — 99999 PR PBB SHADOW E&M-EST. PATIENT-LVL III: CPT | Mod: PBBFAC,,, | Performed by: NURSE PRACTITIONER

## 2017-02-23 PROCEDURE — 80307 DRUG TEST PRSMV CHEM ANLYZR: CPT

## 2017-02-23 PROCEDURE — 86790 VIRUS ANTIBODY NOS: CPT

## 2017-02-23 PROCEDURE — 87522 HEPATITIS C REVRS TRNSCRPJ: CPT

## 2017-02-23 PROCEDURE — 86235 NUCLEAR ANTIGEN ANTIBODY: CPT

## 2017-02-23 PROCEDURE — 36415 COLL VENOUS BLD VENIPUNCTURE: CPT | Mod: PO

## 2017-02-23 PROCEDURE — 99204 OFFICE O/P NEW MOD 45 MIN: CPT | Mod: S$GLB,,, | Performed by: NURSE PRACTITIONER

## 2017-02-23 PROCEDURE — 87340 HEPATITIS B SURFACE AG IA: CPT

## 2017-02-23 PROCEDURE — 86256 FLUORESCENT ANTIBODY TITER: CPT | Mod: 91

## 2017-02-23 PROCEDURE — 82390 ASSAY OF CERULOPLASMIN: CPT

## 2017-02-23 PROCEDURE — 86703 HIV-1/HIV-2 1 RESULT ANTBDY: CPT

## 2017-02-23 PROCEDURE — 87902 NFCT AGT GNTYP ALYS HEP C: CPT

## 2017-02-23 NOTE — MR AVS SNAPSHOT
[facility]  [address] [place] [address]  Phone: [phone]  Fax: [phone]                  Huma Ronquillo   2017 10:00 AM   Office Visit    Description:  Male : 1965   Provider:  KATIANA Wilder   Department:  [facility] - Gastroenterology           Reason for Visit     Hepatitis C     Elevated Hepatic Enzymes           Diagnoses this Visit        Comments    Chronic hepatitis C without hepatic coma    -  Primary     Encounter for screening for HIV                To Do List           Future Appointments        Provider Department Dept Phone    2017 10:30 AM LAB, SAME DAY [facility] [phone]      Goals (5 Years of Data)     None      [facility] On Call     [facility] Nurse Care Line -  Assistance  Registered nurses in the [facility] Center provide clinical advisement, health education, appointment booking, and other advisory services.  Call for this free service at [phone].             Medications           Message regarding Medications     Verify the changes and/or additions to your medication regime listed below are the same as discussed with your clinician today.  If any of these changes or additions are incorrect, please notify your healthcare provider.             Verify that the below list of medications is an accurate representation of the medications you are currently taking.  If none reported, the list may be blank. If incorrect, please contact your healthcare provider. Carry this list with you in case of emergency.           Current Medications     aspirin (ECOTRIN) 81 MG EC tablet Take 81 mg by mouth once daily.    clopidogrel (PLAVIX) 75 mg tablet Take 1 tablet by mouth once daily.    isosorbide mononitrate (IMDUR) 30 MG 24 hr tablet Take 1 tablet by mouth once daily.    LATUDA 40 mg Tab tablet Take 1 tablet by mouth every evening.    metoprolol tartrate (LOPRESSOR) 25 MG tablet Take 1 tablet by mouth 2  (two) times daily.    tramadol (ULTRAM) 50 mg tablet Take 1 tablet by mouth 2 (two) times daily.    trazodone (DESYREL) 100 MG tablet Take 25 mg by mouth every evening.    trihexyphenidyl (ARTANE) 2 MG tablet Take 1 tablet by mouth every evening.    TRINTELLIX 20 mg Tab Take 1 tablet by mouth once daily.    meclizine (ANTIVERT) 25 mg tablet Take 1 tablet (25 mg total) by mouth every 6 (six) hours as needed for Dizziness or Nausea.           Clinical Reference Information           Your Vitals Were     BP Pulse Height Weight BMI    120/86 84 6' (1.829 m) 102.5 kg (225 lb 15.5 oz) 30.65 kg/m2      Blood Pressure          Most Recent Value    BP  120/86      Allergies as of 2/23/2017     Benadryl [Diphenhydramine Hcl]    Codeine      Immunizations Administered on Date of Encounter - 2/23/2017     None      Orders Placed During Today's Visit     Future Labs/Procedures Expected by Expires    Alpha-1-antitrypsin  2/23/2017 4/24/2018    REAL  2/23/2017 4/24/2018    Anti-smooth muscle antibody  2/23/2017 4/24/2018    Antimitochondrial antibody  2/23/2017 4/24/2018    Ceruloplasmin  2/23/2017 4/24/2018    HCV FibroSURE  2/23/2017 4/24/2018    Hepatitis A antibody, IgG  2/23/2017 4/24/2018    Hepatitis B surface antibody  2/23/2017 4/24/2018    Hepatitis B surface antigen  2/23/2017 4/24/2018    Hepatitis C genotype  2/23/2017 4/24/2018    HIV-1 and HIV-2 antibodies  2/23/2017 4/24/2018    Toxicology screen, urine  2/23/2017 4/24/2018      Language Assistance Services     ATTENTION: Language assistance services are available, free of charge. Please call [phone].      ATENCIÓN: Si jayeshla pilar, tiene a boudreaux disposición servicios gratuitos de asistencia lingüística. Llame al [phone].     CHÚ Ý: N?u b?n nói Ti?ng Vi?t, có các d?ch v? h? tr? ngôn ng? mi?n phí dành cho b?n. G?i s? [phone].         [facility] complies with applicable Federal civil rights laws and does not discriminate on the basis  of race, color, national origin, age, disability, or sex.

## 2017-02-23 NOTE — LETTER
February 23, 2017      Bruno Marquis MD  [address] [place] [address]           [facility]  [address] [place] [address]  Phone: [phone]  Fax: [phone]          Patient: Huma Ronquillo   MR Number: [id number]   YOB: 1965   Date of Visit: 2/23/2017       Dear Dr. Bruno Marquis:    Thank you for referring Huma Ronquillo to me for evaluation. Attached you will find relevant portions of my assessment and plan of care.    If you have questions, please do not hesitate to call me. I look forward to following Huma Ronquillo along with you.    Sincerely,    Mariia Woodruff, [facility]    Enclosure  CC:  No Recipients    If you would like to receive this communication electronically, please contact [email or web address] or [phone] to request more information on [facility] access.    For providers and/or their staff who would like to refer a patient to [facility], please contact us through our one-stop-shop provider referral line, [facility], at [phone].    If you feel you have received this communication in error or would no longer like to receive these types of communications, please e-mail [email or web address]

## 2017-02-23 NOTE — PROGRESS NOTES
"Clinic Consult:  [facility] Consultation Note    Reason for Consult:  The primary encounter diagnosis was Chronic hepatitis C without hepatic coma. Diagnoses of Encounter for screening for HIV  and Elevated LFTs were also pertinent to this visit.    PCP: Bruno Marquis   [address] / [place] [address]    HPI:  This is a 51 y.o. male here for evaluation of the above  Pt reports that he was previously diagnosed with HCV in 2001.  He states at that time, he was treated with interferon and ribavirin.  He did not tolerate the medications due to side effects and stopped after about 4 weeks.    Recent labs show PCR of 400K.    He states that he has been feeling well without any GI complaints.  Denies any abdominal pain.  No nausea or vomiting.  No change in bowel pattern.  No melena or hematochezia. No weight loss.  No upper GI bleeding.  No ascites or BLE.  No overt confusion.  He denies any previous IVDU or intranasal drug use.  Stopped all ETOH about 1 year ago.  He reports a tattoo in 1997 at an "unclean tattoo place". No previous blood transfusions or surgeries.       Review of Systems   Constitutional: Negative for chills, fever, malaise/fatigue and weight loss.   Respiratory: Negative for cough.    Cardiovascular: Negative for chest pain.   Gastrointestinal:        Per HPI   Musculoskeletal: Negative for myalgias.   Skin: Negative for itching and rash.   Neurological: Negative for headaches.   Psychiatric/Behavioral: The patient is not nervous/anxious.        Medical History:   Past Medical History   Diagnosis Date    Bipolar disorder     Cardiovascular disease     Depression        Surgical History:  Past Surgical History   Procedure Laterality Date    Rotator cuff repair Right 2013    Tibia fracture surgery Left     Colonoscopy  2016       Family History:   Family History   Problem Relation Age of Onset    Cancer Mother      Lung- smoker    Heart attack Father        Social History: "   Social History   Substance Use Topics    Smoking status: Never Smoker    Smokeless tobacco: Former User    Alcohol use No       Allergies: Reviewed    Home Medications:   Current Outpatient Prescriptions on File Prior to Visit   Medication Sig Dispense Refill    aspirin (ECOTRIN) 81 MG EC tablet Take 81 mg by mouth once daily.      clopidogrel (PLAVIX) 75 mg tablet Take 1 tablet by mouth once daily.      isosorbide mononitrate (IMDUR) 30 MG 24 hr tablet Take 1 tablet by mouth once daily.      LATUDA 40 mg Tab tablet Take 1 tablet by mouth every evening.      metoprolol tartrate (LOPRESSOR) 25 MG tablet Take 1 tablet by mouth 2 (two) times daily.      tramadol (ULTRAM) 50 mg tablet Take 1 tablet by mouth 2 (two) times daily.      trazodone (DESYREL) 100 MG tablet Take 25 mg by mouth every evening.      trihexyphenidyl (ARTANE) 2 MG tablet Take 1 tablet by mouth every evening.      TRINTELLIX 20 mg Tab Take 1 tablet by mouth once daily.      meclizine (ANTIVERT) 25 mg tablet Take 1 tablet (25 mg total) by mouth every 6 (six) hours as needed for Dizziness or Nausea. 30 tablet 0     No current facility-administered medications on file prior to visit.        Physical Exam:  Vital Signs:  Visit Vitals    /86    Pulse 84    Ht 6' (1.829 m)    Wt 102.5 kg (225 lb 15.5 oz)    BMI 30.65 kg/m2     Body mass index is 30.65 kg/(m^2).  Physical Exam   Constitutional: He is oriented to person, place, and time. He appears well-developed and well-nourished.   HENT:   Head: Normocephalic.   Eyes: No scleral icterus.   Neck: Normal range of motion.   Cardiovascular: Normal rate and regular rhythm.    Pulmonary/Chest: Effort normal and breath sounds normal.   Abdominal: Soft. Bowel sounds are normal. He exhibits no distension. There is no tenderness.   Musculoskeletal: Normal range of motion.   Neurological: He is alert and oriented to person, place, and time.   Skin: Skin is warm and dry.   Psychiatric: He  has a normal mood and affect.   Vitals reviewed.      Labs: Pertinent labs reviewed.      Assessment:  1. Chronic hepatitis C without hepatic coma    2. Encounter for screening for HIV     3. Elevated LFTs         Recommendations:  Further work up to determine best treatment options for HCV  Will get Full w/u to R/O other causes of the elevated LFTs  Further recommendations to be determined by results of labs.   Chronic hepatitis C without hepatic coma  -     Alpha-1-antitrypsin; Future; Expected date: 2/23/17  -     REAL; Future; Expected date: 2/23/17  -     Antimitochondrial antibody; Future; Expected date: 2/23/17  -     Anti-smooth muscle antibody; Future; Expected date: 2/23/17  -     Ceruloplasmin; Future; Expected date: 2/23/17  -     Hepatitis A antibody, IgG; Future; Expected date: 2/23/17  -     HCV FibroSURE; Future; Expected date: 2/23/17  -     Hepatitis C genotype; Future; Expected date: 2/23/17  -     HIV-1 and HIV-2 antibodies; Future; Expected date: 2/23/17  -     Toxicology screen, urine; Future; Expected date: 2/23/17  -     Hepatitis B surface antibody; Future; Expected date: 2/23/17  -     Hepatitis B surface antigen; Future; Expected date: 2/23/17    Encounter for screening for HIV   -     HIV-1 and HIV-2 antibodies; Future; Expected date: 2/23/17    Elevated LFTs        No Follow-up on file.        Thank you so much for allowing me to participate in the care of Huma Ronquillo    SIENA Mendoza

## 2017-02-24 LAB
A1AT SERPL-MCNC: 64 MG/DL
ANA SER QL IF: NORMAL
CERULOPLASMIN SERPL-MCNC: 24 MG/DL
HBV SURFACE AB SER-ACNC: NEGATIVE M[IU]/ML
HBV SURFACE AG SERPL QL IA: NEGATIVE
HEPATITIS A ANTIBODY, IGG: POSITIVE
HIV 1+2 AB+HIV1 P24 AG SERPL QL IA: NEGATIVE
MITOCHONDRIA AB TITR SER IF: NORMAL {TITER}

## 2017-02-27 LAB
HCV GENTYP SERPL NAA+PROBE: ABNORMAL
HCV QUALITATIVE RESULT: DETECTED
HCV QUANTITATIVE LOG: 5.67 LOG (10) IU/ML
HCV RNA SPEC NAA+PROBE-ACNC: ABNORMAL IU/ML
SMOOTH MUSCLE AB TITR SER IF: ABNORMAL {TITER}

## 2017-03-02 ENCOUNTER — PATIENT MESSAGE (OUTPATIENT)
Dept: GASTROENTEROLOGY | Facility: CLINIC | Age: 52
End: 2017-03-02

## 2017-03-02 LAB
A2 MACROGLOB SERPL-MCNC: 363 MG/DL (ref 106–279)
ALT SERPL W P-5'-P-CCNC: 54 U/L (ref 9–46)
APO A-I SERPL-MCNC: 136 MG/DL (ref 94–176)
BILIRUB SERPL-MCNC: 0.4 MG/DL (ref 0.2–1.2)
FIBROSIS STAGE SERPL QL: ABNORMAL
FIBROTEST INTERPRETATION: ABNORMAL
FOOTNOTE: ABNORMAL
GGT SERPL-CCNC: 53 U/L (ref 3–95)
HAPTOGLOB SERPL-MCNC: 84 MG/DL (ref 43–212)
LIVER FIBR SCORE SERPL CALC.FIBROSURE: 0.61
NECROINFLAMMAT INTERP: ABNORMAL
NECROINFLAMMATORY ACT GRADE SERPL QL: ABNORMAL
NECROINFLAMMATORY ACT SCORE SERPL: 0.43
REFERENCE ID: ABNORMAL

## 2017-03-08 ENCOUNTER — TELEPHONE (OUTPATIENT)
Dept: PHARMACY | Facility: CLINIC | Age: 52
End: 2017-03-08

## 2017-03-08 DIAGNOSIS — B18.2 CHRONIC HEPATITIS C WITHOUT HEPATIC COMA: Primary | ICD-10-CM

## 2017-03-08 RX ORDER — LEDIPASVIR AND SOFOSBUVIR 90; 400 MG/1; MG/1
1 TABLET, FILM COATED ORAL DAILY
Qty: 28 TABLET | Refills: 2 | Status: SHIPPED | OUTPATIENT
Start: 2017-03-08 | End: 2017-09-05

## 2017-03-08 NOTE — TELEPHONE ENCOUNTER
Called and spoke with patient notifying him that PA on Harvfredy is required and process has been started.  Explained this process takes 1 to 3 days and if patient assistance is needed for the copayment we would assist him as well.  Patient verbalized understanding.

## 2017-03-13 ENCOUNTER — TELEPHONE (OUTPATIENT)
Dept: PHARMACY | Facility: CLINIC | Age: 52
End: 2017-03-13

## 2017-03-13 NOTE — TELEPHONE ENCOUNTER
Patient's wife Medina returned call placed.  Explained that [facility] assistance for Harvoni was approved.      Notified patient's wife that prescription has been sent to [facility] in [place].  Wife is aware that Mr. Finn must speak with a member of our [facility] Team and they will be calling him to obtain information, set up a shipping date and set up a consultation. Wife verbalized understanding that he must speak with a member of our team at [facility] before his medicaiton is shipped and understands it is coming from [place].  Specialty phone number of [phone] was given to the Ms. Haney in the event one of them misses the call.    Patient Assistance (Has been entered in profile)   ID # [id number]   Rx Group #[id number]   PCN # [id number]   RX BIN # [id number]   Hep C Matthew for $15,000   Effective on 3/13/17   End date 3/12/18

## 2017-03-13 NOTE — TELEPHONE ENCOUNTER
Akiko BROOKS Approved on patient's insurance with a large copayment.      PA information:  [facility]  [phone]  PA Ref # [id number]  Approval Dates: 3/12/17 - 6/4/17

## 2017-03-13 NOTE — TELEPHONE ENCOUNTER
Spoke with patient and his wife, notifying him PA was approved for Akiko, however his copayment was $3,492.49.  Patient expressed concerned for copayment, and we discussed patient assistance through [employer].  Obtained financials and permission to file for assistance on patient's behalf.  Patient was thankful for help and verbalized understanding.

## 2017-03-15 ENCOUNTER — TELEPHONE (OUTPATIENT)
Dept: GASTROENTEROLOGY | Facility: CLINIC | Age: 52
End: 2017-03-15

## 2017-03-15 NOTE — TELEPHONE ENCOUNTER
----- Message from Lucretia Alvarez sent at 3/15/2017 12:52 PM CDT -----  She was returning your call.  Call her at [phone].                          gamez

## 2017-03-16 ENCOUNTER — TELEPHONE (OUTPATIENT)
Dept: GASTROENTEROLOGY | Facility: CLINIC | Age: 52
End: 2017-03-16

## 2017-03-16 ENCOUNTER — TELEPHONE (OUTPATIENT)
Dept: PHARMACY | Facility: CLINIC | Age: 52
End: 2017-03-16

## 2017-03-16 DIAGNOSIS — Z23 NEED FOR HEPATITIS B VACCINATION: Primary | ICD-10-CM

## 2017-03-16 NOTE — TELEPHONE ENCOUNTER
DOCUMENTATION ONLY  Akiko prior authorization approved on 03/12/2017 x 12 weeks.    [employer]  [phone]  EOC ID# [id number]  Approval Dates: 03/12/2017 to 06/04/2017      Patient Assistance ([employer])  ID # [id number]  Rx Group #[id number]  PCN # [id number]  RX BIN # [id number]  Hep C Matthew for $85699.00  Effective on 3/13/17  End date 3/12/18

## 2017-03-17 NOTE — TELEPHONE ENCOUNTER
Initial Harvoni 90/400mg consult completed on 3/17/17. Harvoni 90/400mg will be shipped on 3/20/17 to arrive at patient's home on 3/21/17 via [employer]. $0 copay. Patient will start Harvoni 90/400mg on 3/22/17. Address confirmed. Confirmed 2 patient identifiers - name and . Therapy Appropriate.    Harvoni- Take one tablet by mouth daily x 12 weeks  Counseling was reviewed:   1. Patient MUST take Harvoni at the SAME time every day.   2. Patient MUST avoid acid reducers without consulting with myself or provider first. Antacids are to be spaced out at least 4 hours apart from Harvoni.    3. Side effects include headaches and fatigue.   Headache: Patient may treat with OTC remedies. If Tylenol is used, dose should not exceed 2000mg per day - prefers ibuprofen.     DDI: Medication list reviewed and potential DDIs addressed. No DDIs or allergies noted. Patient MUST contact myself or provider prior to starting any new OTC, herbal, or prescription drugs to avoid potential DDIs. Currently taking Zinc, Vitamin B and D.    Discussed the importance of staying well hydrated while on therapy. Compliance stressed - patient to take missed doses as soon as remembered, but NOT to take 2 doses in one day. Patient will report questions or concerns to myself or practitioner. Patient verbalizes understanding. Patient plans to start Harvoni on 3/22/17. Consultation included: indication; goals of treatment; administration; storage and handling; side effects; how to handle side effects; the importance of compliance; how to handle missed doses; the importance of laboratory monitoring; the importance of keeping all follow up appointments.  Patient understands to report any medication changes to [facility] and provider. All questions answered and addressed to patients satisfaction.  I will f/u with patient in 1 week from start, and [facility] will contact patient in 3 weeks to coordinate next refill.

## 2017-03-23 ENCOUNTER — CLINICAL SUPPORT (OUTPATIENT)
Dept: GASTROENTEROLOGY | Facility: CLINIC | Age: 52
End: 2017-03-23
Payer: MEDICARE

## 2017-03-23 VITALS
BODY MASS INDEX: 30.37 KG/M2 | WEIGHT: 224.19 LBS | HEART RATE: 72 BPM | SYSTOLIC BLOOD PRESSURE: 128 MMHG | DIASTOLIC BLOOD PRESSURE: 100 MMHG | HEIGHT: 72 IN

## 2017-03-23 DIAGNOSIS — Z23 NEED FOR HEPATITIS B VACCINATION: ICD-10-CM

## 2017-03-23 PROCEDURE — 90746 HEPB VACCINE 3 DOSE ADULT IM: CPT | Mod: S$GLB,,, | Performed by: NURSE PRACTITIONER

## 2017-03-23 PROCEDURE — G0010 ADMIN HEPATITIS B VACCINE: HCPCS | Mod: S$GLB,,, | Performed by: NURSE PRACTITIONER

## 2017-03-23 PROCEDURE — 99999 PR PBB SHADOW E&M-EST. PATIENT-LVL III: CPT | Mod: PBBFAC,,,

## 2017-03-23 NOTE — PROGRESS NOTES
Two patient identifier.  Administered first dose of Hep B vaccine, 1 ml,  IM to RD. Patient tolerated well.  Band aid applied aseptic technique to injection site.  Patient requested to wait 15 minutes in exam room. No reaction noted.  Patient ambulated off unit with wife.   Lot# [id number].  Exp. 7/7/2019.  [employer]

## 2017-03-23 NOTE — MR AVS SNAPSHOT
[facility]  [address] [place] [address]  Phone: [phone]  Fax: [phone]                  Huma Ronquillo   3/23/2017 1:40 PM   Clinical Support    Description:  Male : 1965   Provider:  GASTRO NURSE [facility]   Department:  [facility] - Gastroenterology           Reason for Visit     Hep B vaccine           Diagnoses this Visit        Comments    Need for hepatitis B vaccination                To Do List           Future Appointments        Provider Department Dept Phone    2017 10:20 AM GASTRO NURSE, [facility] [phone]    2017 10:20 AM GASTRO NURSE, [facility] [phone]      Goals (5 Years of Data)     None      [facility] On Call     [facility] On Call Nurse [facility] Line -  Assistance  Registered nurses in the [facility] On Call Center provide clinical advisement, health education, appointment booking, and other advisory services.  Call for this free service at [phone].             Medications           Message regarding Medications     Verify the changes and/or additions to your medication regime listed below are the same as discussed with your clinician today.  If any of these changes or additions are incorrect, please notify your healthcare provider.             Verify that the below list of medications is an accurate representation of the medications you are currently taking.  If none reported, the list may be blank. If incorrect, please contact your healthcare provider. Carry this list with you in case of emergency.           Current Medications     aspirin (ECOTRIN) 81 MG EC tablet Take 81 mg by mouth once daily.    clopidogrel (PLAVIX) 75 mg tablet Take 1 tablet by mouth once daily.    isosorbide mononitrate (IMDUR) 30 MG 24 hr tablet Take 1 tablet by mouth once daily.    LATUDA 40 mg Tab tablet Take 1 tablet by mouth every evening.    ledipasvir-sofosbuvir  mg Tab Take 1 tablet by mouth once daily.     metoprolol tartrate (LOPRESSOR) 25 MG tablet Take 1 tablet by mouth 2 (two) times daily.    tramadol (ULTRAM) 50 mg tablet Take 1 tablet by mouth 2 (two) times daily.    trazodone (DESYREL) 100 MG tablet Take 25 mg by mouth every evening.    trihexyphenidyl (ARTANE) 2 MG tablet Take 1 tablet by mouth every evening.    TRINTELLIX 20 mg Tab Take 1 tablet by mouth once daily.    meclizine (ANTIVERT) 25 mg tablet Take 1 tablet (25 mg total) by mouth every 6 (six) hours as needed for Dizziness or Nausea.           Clinical Reference Information           Your Vitals Were     BP Pulse Height Weight BMI    128/100 72 6' (1.829 m) 101.7 kg (224 lb 3.3 oz) 30.41 kg/m2      Blood Pressure          Most Recent Value    BP  (!)  128/100      Allergies as of 3/23/2017     Benadryl [Diphenhydramine Hcl]    Codeine      Immunizations Administered on Date of Encounter - 3/23/2017     Name Date Dose VIS Date Route    Hepatitis B, Adult 3/23/2017 1 mL 7/20/2016 Intramuscular      Orders Placed During Today's Visit      Normal Orders This Visit    Hepatitis B Vaccine (Adult) (IM)       Language Assistance Services     ATTENTION: Language assistance services are available, free of charge. Please call [phone].      ATENCIÓN: Si jayeshla pilar, tiene a boudreaux disposición servicios gratuitos de asistencia lingüística. Llame al [phone].     [facility] Ý: N?u b?n nói Ti?ng Vi?t, có các d?ch v? h? tr? ngôn ng? mi?n phí dành cho b?n. G?i s? [phone].         [facility] complies with applicable Federal civil rights laws and does not discriminate on the basis of race, color, national origin, age, disability, or sex.

## 2017-04-05 ENCOUNTER — PATIENT MESSAGE (OUTPATIENT)
Dept: GASTROENTEROLOGY | Facility: CLINIC | Age: 52
End: 2017-04-05

## 2017-04-07 ENCOUNTER — TELEPHONE (OUTPATIENT)
Dept: PHARMACY | Facility: CLINIC | Age: 52
End: 2017-04-07

## 2017-04-07 NOTE — TELEPHONE ENCOUNTER
Akiko refill arrangement and f/u. Confirmed 2 patient identifiers - name and . He/she has 11 doses remaining which will get him/her through 17. No major side effects or missed doses reported. Patient has had some fatigue but it appears to be dissipating or relieved with a nap. He/she confirms no changes to insurance or health and has no further questions at this time. Patient asked to have medication shipped on 17 to arrive at patient's home on 17. $0 copay. Address confirmed.  All questions answered and addressed to patients satisfaction. [facility] will continue to reach out to patient monthly to arrange refills.

## 2017-04-26 ENCOUNTER — PATIENT MESSAGE (OUTPATIENT)
Dept: INTERNAL MEDICINE | Facility: CLINIC | Age: 52
End: 2017-04-26

## 2017-04-27 ENCOUNTER — CLINICAL SUPPORT (OUTPATIENT)
Dept: GASTROENTEROLOGY | Facility: CLINIC | Age: 52
End: 2017-04-27
Payer: MEDICARE

## 2017-04-27 VITALS
WEIGHT: 222.69 LBS | BODY MASS INDEX: 30.16 KG/M2 | SYSTOLIC BLOOD PRESSURE: 136 MMHG | DIASTOLIC BLOOD PRESSURE: 78 MMHG | HEART RATE: 63 BPM | HEIGHT: 72 IN

## 2017-04-27 DIAGNOSIS — Z23 NEED FOR HEPATITIS B VACCINATION: ICD-10-CM

## 2017-04-27 PROCEDURE — G0010 ADMIN HEPATITIS B VACCINE: HCPCS | Mod: S$GLB,,, | Performed by: NURSE PRACTITIONER

## 2017-04-27 PROCEDURE — 90746 HEPB VACCINE 3 DOSE ADULT IM: CPT | Mod: S$GLB,,, | Performed by: NURSE PRACTITIONER

## 2017-04-27 PROCEDURE — 99999 PR PBB SHADOW E&M-EST. PATIENT-LVL III: CPT | Mod: PBBFAC,,,

## 2017-04-27 NOTE — MR AVS SNAPSHOT
[facility] Gastroenterology  [address] [place] [address]  Phone: [phone]  Fax: [phone]                  Huma Ronquillo   2017 10:20 AM   Clinical Support    Description:  Male : 1965   Provider:  GASTRO NURSE [facility]   Department:  [facility] - Gastroenterology           Reason for Visit     Hep B Vaccine           Diagnoses this Visit        Comments    Need for hepatitis B vaccination                To Do List           Future Appointments        Provider Department Dept Phone    2017 3:40 PM Bruno Marquis MD [facility] Internal Medicine [phone]    2017 10:20 AM GASTRO NURSE, [facility] Gastroenterology [phone]      Goals (5 Years of Data)     None      [facility]     [facility] On Call Nurse Care Line -  Assistance  Unless otherwise directed by your provider, please contact [facility], our nurse care line that is available for  assistance.     Registered nurses in the [facility] On Call Center provide: appointment scheduling, clinical advisement, health education, and other advisory services.  Call: [phone] (toll free)               Medications           Message regarding Medications     Verify the changes and/or additions to your medication regime listed below are the same as discussed with your clinician today.  If any of these changes or additions are incorrect, please notify your healthcare provider.             Verify that the below list of medications is an accurate representation of the medications you are currently taking.  If none reported, the list may be blank. If incorrect, please contact your healthcare provider. Carry this list with you in case of emergency.           Current Medications     aspirin (ECOTRIN) 81 MG EC tablet Take 81 mg by mouth once daily.    clopidogrel (PLAVIX) 75 mg tablet Take 1 tablet by mouth once daily.    isosorbide mononitrate (IMDUR) 30 MG 24 hr tablet Take 1 tablet by mouth once daily.    LATUDA 40 mg  Tab tablet Take 1 tablet by mouth every evening.    ledipasvir-sofosbuvir  mg Tab Take 1 tablet by mouth once daily.    metoprolol tartrate (LOPRESSOR) 25 MG tablet Take 1 tablet by mouth 2 (two) times daily.    tramadol (ULTRAM) 50 mg tablet Take 1 tablet by mouth 2 (two) times daily.    trazodone (DESYREL) 100 MG tablet Take 25 mg by mouth every evening.    trihexyphenidyl (ARTANE) 2 MG tablet Take 1 tablet by mouth every evening.    TRINTELLIX 20 mg Tab Take 1 tablet by mouth once daily.    meclizine (ANTIVERT) 25 mg tablet Take 1 tablet (25 mg total) by mouth every 6 (six) hours as needed for Dizziness or Nausea.           Clinical Reference Information           Your Vitals Were     BP Pulse Height Weight BMI    136/78 63 6' (1.829 m) 101 kg (222 lb 10.6 oz) 30.2 kg/m2      Blood Pressure          Most Recent Value    BP  136/78      Allergies as of 4/27/2017     Benadryl [Diphenhydramine Hcl]    Codeine      Immunizations Administered on Date of Encounter - 4/27/2017     Name Date Dose VIS Date Route    Hepatitis B, Adult 4/27/2017 1 mL 7/20/2016 Intramuscular    Hepatitis B, Adult  Incomplete 1 mL 7/20/2016 Intramuscular      Orders Placed During Today's Visit      Normal Orders This Visit    Hepatitis B Vaccine (Adult) (IM)     Hepatitis B Vaccine (Adult) (IM)       Language Assistance Services     ATTENTION: Language assistance services are available, free of charge. Please call [phone].      ATENCIÓN: Si habla julioañol, tiene a boudreaux disposición servicios gratuitos de asistencia lingüística. Llame al [phone].     [facility] Ý: N?u b?n nói Ti?ng Vi?t, có các d?ch v? h? tr? ngôn ng? mi?n phí dành cho b?n. G?i s? [phone].         [facility] - Gastroenterology complies with applicable Federal civil rights laws and does not discriminate on the basis of race, color, national origin, age, disability, or sex.

## 2017-04-27 NOTE — PROGRESS NOTES
Two patient identifier verified.  Administered  Second dose of Hep B vaccine , 1 ml, IM to RD  per provider order. Patient tolerated well.  Band aid applied aseptic technique to injection site.  Patient requested to wait 15 minutes in exam room. No reaction noted.  Patient ambulated off unit.   Lot # [id number].  Exp. 7/7/2019.  [employer]

## 2017-05-01 ENCOUNTER — OFFICE VISIT (OUTPATIENT)
Dept: INTERNAL MEDICINE | Facility: CLINIC | Age: 52
End: 2017-05-01
Payer: MEDICARE

## 2017-05-01 VITALS
WEIGHT: 220.88 LBS | SYSTOLIC BLOOD PRESSURE: 124 MMHG | OXYGEN SATURATION: 97 % | HEART RATE: 77 BPM | BODY MASS INDEX: 29.92 KG/M2 | DIASTOLIC BLOOD PRESSURE: 78 MMHG | TEMPERATURE: 98 F | HEIGHT: 72 IN

## 2017-05-01 DIAGNOSIS — Z78.9 STATIN INTOLERANCE: ICD-10-CM

## 2017-05-01 DIAGNOSIS — Z01.818 PREOP EXAMINATION: ICD-10-CM

## 2017-05-01 DIAGNOSIS — I25.119 CORONARY ARTERY DISEASE INVOLVING NATIVE CORONARY ARTERY OF NATIVE HEART WITH ANGINA PECTORIS: Primary | ICD-10-CM

## 2017-05-01 DIAGNOSIS — F31.9 BIPOLAR AFFECTIVE DISORDER, REMISSION STATUS UNSPECIFIED: ICD-10-CM

## 2017-05-01 DIAGNOSIS — B18.2 CHRONIC HEPATITIS C WITHOUT HEPATIC COMA: ICD-10-CM

## 2017-05-01 PROCEDURE — 99999 PR PBB SHADOW E&M-EST. PATIENT-LVL III: CPT | Mod: PBBFAC,,, | Performed by: INTERNAL MEDICINE

## 2017-05-01 PROCEDURE — 1160F RVW MEDS BY RX/DR IN RCRD: CPT | Mod: S$GLB,,, | Performed by: INTERNAL MEDICINE

## 2017-05-01 PROCEDURE — 99214 OFFICE O/P EST MOD 30 MIN: CPT | Mod: S$GLB,,, | Performed by: INTERNAL MEDICINE

## 2017-05-01 NOTE — MR AVS SNAPSHOT
[facility] Internal Medicine  [address] [place] [address]  Phone: [phone]  Fax: [phone]                  Huma Ronquillo   2017 3:40 PM   Office Visit    Description:  Male : 1965   Provider:  Bruno Marquis MD   Department:  [facility] - Internal Medicine           Reason for Visit     Pre-op Exam                To Do List           Future Appointments        Provider Department Dept Phone    2017 10:20 AM GASTRO NURSE, [facility] - Gastroenterology [phone]      Goals (5 Years of Data)     None      [facility]     [facility] On Call Nurse Care Line -  Assistance  Unless otherwise directed by your provider, please contact [facility], our nurse care line that is available for  assistance.     Registered nurses in the [facility] On Call Center provide: appointment scheduling, clinical advisement, health education, and other advisory services.  Call: [phone] (toll free)               Medications           Message regarding Medications     Verify the changes and/or additions to your medication regime listed below are the same as discussed with your clinician today.  If any of these changes or additions are incorrect, please notify your healthcare provider.             Verify that the below list of medications is an accurate representation of the medications you are currently taking.  If none reported, the list may be blank. If incorrect, please contact your healthcare provider. Carry this list with you in case of emergency.           Current Medications     aspirin (ECOTRIN) 81 MG EC tablet Take 81 mg by mouth once daily.    clopidogrel (PLAVIX) 75 mg tablet Take 1 tablet by mouth once daily.    isosorbide mononitrate (IMDUR) 30 MG 24 hr tablet Take 1 tablet by mouth once daily.    LATUDA 40 mg Tab tablet Take 1 tablet by mouth every evening.    ledipasvir-sofosbuvir  mg Tab Take 1 tablet by mouth once daily.    meclizine (ANTIVERT) 25 mg tablet Take 1  tablet (25 mg total) by mouth every 6 (six) hours as needed for Dizziness or Nausea.    metoprolol tartrate (LOPRESSOR) 25 MG tablet Take 1 tablet by mouth 2 (two) times daily.    tramadol (ULTRAM) 50 mg tablet Take 1 tablet by mouth 2 (two) times daily.    trazodone (DESYREL) 100 MG tablet Take 25 mg by mouth every evening.    trihexyphenidyl (ARTANE) 2 MG tablet Take 1 tablet by mouth every evening.    TRINTELLIX 20 mg Tab Take 1 tablet by mouth once daily.           Clinical Reference Information           Your Vitals Were     BP Pulse Temp Height Weight SpO2    124/78 (BP Location: Right arm, Patient Position: Sitting) 77 97.7 °F (36.5 °C) (Tympanic) 6' (1.829 m) 100.2 kg (220 lb 14.4 oz) 97%    BMI                29.96 kg/m2          Blood Pressure          Most Recent Value    BP  124/78      Allergies as of 5/1/2017     Benadryl [Diphenhydramine Hcl]    Codeine      Immunizations Administered on Date of Encounter - 5/1/2017     None      Language Assistance Services     ATTENTION: Language assistance services are available, free of charge. Please call [phone].      ATENCIÓN: Si jayeshla pilar, tiene a boudreaux disposición servicios gratuitos de asistencia lingüística. Llame al [phone].     [facility] Ý: N?u b?n nói Ti?ng Vi?t, có các d?ch v? h? tr? ngôn ng? mi?n phí dành cho b?n. G?i s? [phone].         [facility] - Internal Medicine complies with applicable Federal civil rights laws and does not discriminate on the basis of race, color, national origin, age, disability, or sex.

## 2017-05-01 NOTE — PROGRESS NOTES
Subjective:      Patient ID: Huma Ronquillo is a 51 y.o. male.    Chief Complaint: Pre-op Exam    HPI Comments: 50 yo with Patient Active Problem List:     Bipolar disorder     Coronary artery disease involving native coronary artery of native heart with angina pectoris     Statin intolerance    Here today for preop for hardware removal left knee planned with Dr. Huang. Able to walk up 2 flights of stairs without cp or dyspnea. Has cardiac stress test planned for tomorrow with Dr. Cool. Planning for labs with ortho.    Past Surgical History:  2016: COLONOSCOPY  2013: ROTATOR CUFF REPAIR Right  No date: TIBIA FRACTURE SURGERY Left  Cardiac stent 2015    Social History    Marital status:              Spouse name:                       Years of education:                 Number of children:               Occupational History    None on file    Social History Main Topics    Smoking status: Never Smoker                                                                Smokeless status: Former User                          Quit date: 1/1/1992    Alcohol use: No              Drug use: No              Sexual activity: Not on file          Other Topics            Concern    None on file    Social History Narrative    None on file      family history includes Cancer in his mother-small cell lung cancer; Heart attack in his father with MI in his 50s, alcoholism. Depression in sibling.  No f/h of bleeding or clotting d/o.          Review of Systems   Constitutional: Negative for activity change, chills, fever and unexpected weight change.   HENT: Negative for hearing loss, rhinorrhea and trouble swallowing.    Eyes: Negative for discharge and visual disturbance.   Respiratory: Negative for chest tightness and wheezing.    Cardiovascular: Negative for chest pain and palpitations.   Gastrointestinal: Negative for blood in stool, constipation, diarrhea and vomiting.   Endocrine: Negative for polydipsia and polyuria.    Genitourinary: Negative for difficulty urinating, hematuria and urgency.   Musculoskeletal: Positive for arthralgias. Negative for joint swelling.   Neurological: Negative for weakness and headaches.   Psychiatric/Behavioral: Negative for confusion and dysphoric mood.     Objective:   /78 (BP Location: Right arm, Patient Position: Sitting)  Pulse 77  Temp 97.7 °F (36.5 °C) (Tympanic)   Ht 6' (1.829 m)  Wt 100.2 kg (220 lb 14.4 oz)  SpO2 97%  BMI 29.96 kg/m2    Physical Exam   Constitutional: He is oriented to person, place, and time. He appears well-developed and well-nourished. No distress.   HENT:   Head: Normocephalic and atraumatic.   Mouth/Throat: Oropharynx is clear and moist.   Eyes: EOM are normal. Pupils are equal, round, and reactive to light.   Neck: Neck supple. Carotid bruit is not present. No thyromegaly present.   Cardiovascular: Normal rate and regular rhythm.    Pulmonary/Chest: Breath sounds normal. He has no wheezes. He has no rales.   Abdominal: Soft. Bowel sounds are normal. There is no tenderness.   Musculoskeletal: He exhibits no edema.   Lymphadenopathy:     He has no cervical adenopathy.   Neurological: He is alert and oriented to person, place, and time.   Skin: Skin is warm and dry.   Psychiatric: He has a normal mood and affect. His behavior is normal.     Lab results from [facility] dated May 2, 2017 CBC within normal limits CMP within normal limits. cxr normal.  Assessment:     1. Coronary artery disease involving native coronary artery of native heart with angina pectoris    2. Chronic hepatitis C without hepatic coma    3. Bipolar affective disorder, remission status unspecified    4. Statin intolerance    5. Preop examination      Plan:   Coronary artery disease involving native coronary artery of native heart with angina pectoris  Up to date with cards f/u. Has stress test scheduled prior to surgery    Chronic hepatitis C without hepatic coma  Treated  with curry    Bipolar affective disorder, remission status unspecified  Cont recs as per psyc    Statin intolerance    Preop examination  Mr. Ronquillo  will need cardiac clearance from his cardiologist.  He is OK for knee surgery from a general medicine standpoint.                 No Follow-up on file.

## 2017-05-05 ENCOUNTER — TELEPHONE (OUTPATIENT)
Dept: INTERNAL MEDICINE | Facility: CLINIC | Age: 52
End: 2017-05-05

## 2017-05-05 ENCOUNTER — TELEPHONE (OUTPATIENT)
Dept: PHARMACY | Facility: CLINIC | Age: 52
End: 2017-05-05

## 2017-05-05 DIAGNOSIS — B18.2 CHRONIC HEPATITIS C WITHOUT HEPATIC COMA: Primary | ICD-10-CM

## 2017-05-05 NOTE — TELEPHONE ENCOUNTER
Please fax my last progress note to Mr. fernández orthopedic surgeon.  Please notify the patient that we have faxed his information.

## 2017-05-05 NOTE — TELEPHONE ENCOUNTER
----- Message from Lucretia Alvarez sent at 5/5/2017  9:06 AM CDT -----  Contact: [facility]  They need pre-op clearance faxed to [phone] and phone [phone].    Surgery is Monday.                                                                      gamez

## 2017-05-05 NOTE — TELEPHONE ENCOUNTER
Notified Michaela at the [facility] that we are waiting on lab results from the orthopedist's office so that pre-op clearance can be completed.  Micahela stated she has results and will fax them to clinic now.
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
## 2017-05-19 ENCOUNTER — PATIENT MESSAGE (OUTPATIENT)
Dept: GASTROENTEROLOGY | Facility: CLINIC | Age: 52
End: 2017-05-19

## 2017-06-13 ENCOUNTER — LAB VISIT (OUTPATIENT)
Dept: LAB | Facility: HOSPITAL | Age: 52
End: 2017-06-13
Attending: NURSE PRACTITIONER
Payer: MEDICARE

## 2017-06-13 DIAGNOSIS — B18.2 CHRONIC HEPATITIS C WITHOUT HEPATIC COMA: ICD-10-CM

## 2017-06-13 LAB
ALBUMIN SERPL BCP-MCNC: 4.2 G/DL
ALP SERPL-CCNC: 78 U/L
ALT SERPL W/O P-5'-P-CCNC: 28 U/L
ANION GAP SERPL CALC-SCNC: 9 MMOL/L
AST SERPL-CCNC: 29 U/L
BASOPHILS # BLD AUTO: 0.05 K/UL
BASOPHILS NFR BLD: 0.7 %
BILIRUB SERPL-MCNC: 0.6 MG/DL
BUN SERPL-MCNC: 12 MG/DL
CALCIUM SERPL-MCNC: 10.2 MG/DL
CHLORIDE SERPL-SCNC: 105 MMOL/L
CO2 SERPL-SCNC: 24 MMOL/L
CREAT SERPL-MCNC: 1 MG/DL
DIFFERENTIAL METHOD: ABNORMAL
EOSINOPHIL # BLD AUTO: 0.2 K/UL
EOSINOPHIL NFR BLD: 2.2 %
ERYTHROCYTE [DISTWIDTH] IN BLOOD BY AUTOMATED COUNT: 13.6 %
EST. GFR  (AFRICAN AMERICAN): >60 ML/MIN/1.73 M^2
EST. GFR  (NON AFRICAN AMERICAN): >60 ML/MIN/1.73 M^2
GLUCOSE SERPL-MCNC: 90 MG/DL
HCT VFR BLD AUTO: 45.3 %
HGB BLD-MCNC: 15.9 G/DL
LYMPHOCYTES # BLD AUTO: 3.2 K/UL
LYMPHOCYTES NFR BLD: 42.9 %
MCH RBC QN AUTO: 32.8 PG
MCHC RBC AUTO-ENTMCNC: 35.1 %
MCV RBC AUTO: 93 FL
MONOCYTES # BLD AUTO: 0.5 K/UL
MONOCYTES NFR BLD: 7.3 %
NEUTROPHILS # BLD AUTO: 3.4 K/UL
NEUTROPHILS NFR BLD: 46.8 %
PLATELET # BLD AUTO: 231 K/UL
PMV BLD AUTO: 9.5 FL
POTASSIUM SERPL-SCNC: 5.2 MMOL/L
PROT SERPL-MCNC: 7.8 G/DL
RBC # BLD AUTO: 4.85 M/UL
SODIUM SERPL-SCNC: 138 MMOL/L
WBC # BLD AUTO: 7.36 K/UL

## 2017-06-13 PROCEDURE — 85025 COMPLETE CBC W/AUTO DIFF WBC: CPT

## 2017-06-13 PROCEDURE — 36415 COLL VENOUS BLD VENIPUNCTURE: CPT | Mod: PO

## 2017-06-13 PROCEDURE — 80053 COMPREHEN METABOLIC PANEL: CPT

## 2017-06-13 PROCEDURE — 87522 HEPATITIS C REVRS TRNSCRPJ: CPT

## 2017-06-15 DIAGNOSIS — B18.2 CHRONIC HEPATITIS C WITHOUT HEPATIC COMA: Primary | ICD-10-CM

## 2017-06-15 LAB
HCV LOG: <1.08 LOG (10) IU/ML
HCV RNA QUANT PCR: <12 IU/ML
HCV, QUALITATIVE: NOT DETECTED IU/ML

## 2017-07-14 ENCOUNTER — TELEPHONE (OUTPATIENT)
Dept: PHARMACY | Facility: CLINIC | Age: 52
End: 2017-07-14

## 2017-08-12 ENCOUNTER — PATIENT MESSAGE (OUTPATIENT)
Dept: INTERNAL MEDICINE | Facility: CLINIC | Age: 52
End: 2017-08-12

## 2017-09-05 ENCOUNTER — OFFICE VISIT (OUTPATIENT)
Dept: INTERNAL MEDICINE | Facility: CLINIC | Age: 52
End: 2017-09-05
Payer: MEDICARE

## 2017-09-05 VITALS
WEIGHT: 224.19 LBS | OXYGEN SATURATION: 98 % | SYSTOLIC BLOOD PRESSURE: 138 MMHG | DIASTOLIC BLOOD PRESSURE: 98 MMHG | BODY MASS INDEX: 30.37 KG/M2 | TEMPERATURE: 98 F | HEIGHT: 72 IN | HEART RATE: 83 BPM

## 2017-09-05 DIAGNOSIS — M79.662 PAIN OF LEFT CALF: ICD-10-CM
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
## 2017-09-05 DIAGNOSIS — M79.662 TENDERNESS OF LEFT CALF: ICD-10-CM

## 2017-09-05 PROCEDURE — 3008F BODY MASS INDEX DOCD: CPT | Mod: S$GLB,,, | Performed by: INTERNAL MEDICINE

## 2017-09-05 PROCEDURE — 99214 OFFICE O/P EST MOD 30 MIN: CPT | Mod: S$GLB,,, | Performed by: INTERNAL MEDICINE

## 2017-09-05 PROCEDURE — 99999 PR PBB SHADOW E&M-EST. PATIENT-LVL IV: CPT | Mod: PBBFAC,,, | Performed by: INTERNAL MEDICINE

## 2017-09-05 RX ORDER — CLINDAMYCIN HYDROCHLORIDE 300 MG/1
300 CAPSULE ORAL 3 TIMES DAILY
Qty: 30 CAPSULE | Refills: 0 | Status: SHIPPED | OUTPATIENT
Start: 2017-09-05 | End: 2017-09-15

## 2017-09-05 RX ORDER — IBUPROFEN 800 MG/1
800 TABLET ORAL 3 TIMES DAILY PRN
COMMUNITY
End: 2018-12-04

## 2017-09-05 RX ORDER — CLINDAMYCIN HYDROCHLORIDE 300 MG/1
300 CAPSULE ORAL 3 TIMES DAILY
Qty: 30 CAPSULE | Refills: 0 | Status: SHIPPED | OUTPATIENT
Start: 2017-09-05 | End: 2017-09-05 | Stop reason: SDUPTHER

## 2017-09-05 RX ORDER — TRAMADOL HYDROCHLORIDE 50 MG/1
TABLET ORAL
Qty: 30 TABLET | Refills: 0 | Status: SHIPPED | OUTPATIENT
Start: 2017-09-05 | End: 2021-09-03

## 2017-09-05 RX ORDER — TRAMADOL HYDROCHLORIDE 50 MG/1
TABLET ORAL
Qty: 30 TABLET | Refills: 0 | Status: SHIPPED | OUTPATIENT
Start: 2017-09-05 | End: 2017-09-05 | Stop reason: SDUPTHER
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
## 2017-09-06 ENCOUNTER — OFFICE VISIT (OUTPATIENT)
Dept: SURGERY | Facility: CLINIC | Age: 52
End: 2017-09-06
Payer: MEDICARE

## 2017-09-06 ENCOUNTER — HOSPITAL ENCOUNTER (OUTPATIENT)
Dept: RADIOLOGY | Facility: HOSPITAL | Age: 52
Discharge: HOME | End: 2017-09-06
Attending: PHYSICIAN ASSISTANT
Payer: MEDICARE

## 2017-09-06 ENCOUNTER — HOSPITAL ENCOUNTER (OUTPATIENT)
Dept: RADIOLOGY | Facility: HOSPITAL | Age: 52
Discharge: HOME | End: 2017-09-06
Attending: INTERNAL MEDICINE
Payer: MEDICARE

## 2017-09-06 VITALS
WEIGHT: 221.56 LBS | DIASTOLIC BLOOD PRESSURE: 68 MMHG | TEMPERATURE: 99 F | BODY MASS INDEX: 30.05 KG/M2 | SYSTOLIC BLOOD PRESSURE: 128 MMHG | HEART RATE: 79 BPM

## 2017-09-06 DIAGNOSIS — M79.605 LEG PAIN, DIFFUSE, LEFT: ICD-10-CM

## 2017-09-06 DIAGNOSIS — S80.12XS HEMATOMA OF LEG, LEFT, SEQUELA: ICD-10-CM

## 2017-09-06 DIAGNOSIS — M79.662 TENDERNESS OF LEFT CALF: ICD-10-CM

## 2017-09-06 DIAGNOSIS — S71.132S: Primary | ICD-10-CM

## 2017-09-06 DIAGNOSIS — M79.662 PAIN OF LEFT CALF: ICD-10-CM

## 2017-09-06 DIAGNOSIS — S71.132S: ICD-10-CM

## 2017-09-06 PROCEDURE — 99999 PR PBB SHADOW E&M-EST. PATIENT-LVL III: CPT | Mod: PBBFAC,,, | Performed by: PHYSICIAN ASSISTANT

## 2017-09-06 PROCEDURE — 93971 EXTREMITY STUDY: CPT | Mod: TC,PO

## 2017-09-06 PROCEDURE — 76881 US COMPL JOINT R-T W/IMG: CPT | Mod: 26,,, | Performed by: RADIOLOGY

## 2017-09-06 PROCEDURE — 99212 OFFICE O/P EST SF 10 MIN: CPT | Mod: S$GLB,,, | Performed by: PHYSICIAN ASSISTANT

## 2017-09-06 PROCEDURE — 93971 EXTREMITY STUDY: CPT | Mod: 26,,, | Performed by: RADIOLOGY

## 2017-09-06 PROCEDURE — 3008F BODY MASS INDEX DOCD: CPT | Mod: S$GLB,,, | Performed by: PHYSICIAN ASSISTANT

## 2017-09-06 PROCEDURE — 76881 US COMPL JOINT R-T W/IMG: CPT | Mod: TC,PO,59

## 2017-09-06 NOTE — LETTER
September 6, 2017      Bruno Marquis MD  [address] [place] [address]           [facility] General Surgery  [address] [place] [address]  Phone: [phone]  Fax: [phone]          Patient: Huma Ronquillo   MR Number: [id number]   YOB: 1965   Date of Visit: 9/6/2017       Dear Dr. Bruno Marquis:    Thank you for referring Huma Ronquillo to me for evaluation. Attached you will find relevant portions of my assessment and plan of care.    If you have questions, please do not hesitate to call me. I look forward to following Huma Ronquillo along with you.    Sincerely,    Sasha Keenan PA-C    Enclosure  CC:  No Recipients    If you would like to receive this communication electronically, please contact [email or web address] or [phone] to request more information on [employer] access.    For providers and/or their staff who would like to refer a patient to [facility], please contact us through our one-stop-shop provider referral line, [facility], at [phone].    If you feel you have received this communication in error or would no longer like to receive these types of communications, please e-mail [email or web address]
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
## 2017-09-29 ENCOUNTER — TELEPHONE (OUTPATIENT)
Dept: PHARMACY | Facility: CLINIC | Age: 52
End: 2017-09-29

## 2017-09-29 NOTE — TELEPHONE ENCOUNTER
Donald,    Mr Ronquillo is due for SVR12 labs.    Will he get these drawn?    Zuhair Castellano, PharmD, [facility]  [facility]  [phone]

## 2017-10-11 ENCOUNTER — TELEPHONE (OUTPATIENT)
Dept: INTERNAL MEDICINE | Facility: CLINIC | Age: 52
End: 2017-10-11

## 2017-10-11 NOTE — TELEPHONE ENCOUNTER
----- Message from Grisel Ag sent at 10/11/2017 12:04 PM CDT -----  Contact: Dr Llamas  Please call Dr Llamas @ [phone] regarding pt medication, have some question.

## 2017-10-11 NOTE — TELEPHONE ENCOUNTER
Spoke with Dr. Llamas who stated pt wanted to try a different sleep aid so she gave pt samples of Silenor and pt will discontinue trazodone.

## 2018-01-02 ENCOUNTER — TELEPHONE (OUTPATIENT)
Dept: PHARMACY | Facility: CLINIC | Age: 53
End: 2018-01-02

## 2018-01-02 NOTE — TELEPHONE ENCOUNTER
Conducted EOT QoL assessment with patient.  Name and  confirmed.  Patient states he is doing well.  He was asymptomatic before HCV treatment and remains the same after completing treatment.  He is able to exercise 4 times a week and keep up with normal daily activities.  Informed patient to make a lab appointment for SVR 12.  He stated he has a change of insurance and will call the provider's office.    All questions answered and addressed to patients satisfaction.

## 2018-02-15 ENCOUNTER — HOSPITAL ENCOUNTER (EMERGENCY)
Facility: HOSPITAL | Age: 53
Discharge: HOME | End: 2018-02-16
Attending: EMERGENCY MEDICINE
Payer: MEDICARE

## 2018-02-15 DIAGNOSIS — R41.82 ALTERED MENTAL STATUS: ICD-10-CM

## 2018-02-15 DIAGNOSIS — F31.9 BIPOLAR AFFECTIVE DISORDER, REMISSION STATUS UNSPECIFIED: ICD-10-CM

## 2018-02-15 DIAGNOSIS — F10.920 ALCOHOLIC INTOXICATION WITHOUT COMPLICATION: Primary | ICD-10-CM

## 2018-02-15 LAB
ALBUMIN SERPL BCP-MCNC: 4.5 G/DL
ALP SERPL-CCNC: 67 U/L
ALT SERPL W/O P-5'-P-CCNC: 22 U/L
AMPHET+METHAMPHET UR QL: NEGATIVE
ANION GAP SERPL CALC-SCNC: 12 MMOL/L
APAP SERPL-MCNC: <3 UG/ML
AST SERPL-CCNC: 32 U/L
BARBITURATES UR QL SCN>200 NG/ML: NEGATIVE
BASOPHILS # BLD AUTO: 0.05 K/UL
BASOPHILS NFR BLD: 0.6 %
BENZODIAZ UR QL SCN>200 NG/ML: NEGATIVE
BILIRUB SERPL-MCNC: 0.6 MG/DL
BILIRUB UR QL STRIP: NEGATIVE
BUN SERPL-MCNC: 7 MG/DL
BZE UR QL SCN: NEGATIVE
CALCIUM SERPL-MCNC: 10 MG/DL
CANNABINOIDS UR QL SCN: NEGATIVE
CHLORIDE SERPL-SCNC: 107 MMOL/L
CLARITY UR: CLEAR
CO2 SERPL-SCNC: 21 MMOL/L
COLOR UR: YELLOW
CREAT SERPL-MCNC: 1.2 MG/DL
CREAT UR-MCNC: 23.1 MG/DL
DIFFERENTIAL METHOD: ABNORMAL
EOSINOPHIL # BLD AUTO: 0.2 K/UL
EOSINOPHIL NFR BLD: 1.8 %
ERYTHROCYTE [DISTWIDTH] IN BLOOD BY AUTOMATED COUNT: 14.2 %
EST. GFR  (AFRICAN AMERICAN): >60 ML/MIN/1.73 M^2
EST. GFR  (NON AFRICAN AMERICAN): >60 ML/MIN/1.73 M^2
ETHANOL SERPL-MCNC: 286 MG/DL
GLUCOSE SERPL-MCNC: 86 MG/DL
GLUCOSE UR QL STRIP: NEGATIVE
HCT VFR BLD AUTO: 52.3 %
HGB BLD-MCNC: 18.8 G/DL
HGB UR QL STRIP: NEGATIVE
KETONES UR QL STRIP: NEGATIVE
LEUKOCYTE ESTERASE UR QL STRIP: NEGATIVE
LYMPHOCYTES # BLD AUTO: 4.1 K/UL
LYMPHOCYTES NFR BLD: 46.5 %
MCH RBC QN AUTO: 32.4 PG
MCHC RBC AUTO-ENTMCNC: 35.9 G/DL
MCV RBC AUTO: 90 FL
METHADONE UR QL SCN>300 NG/ML: NEGATIVE
MONOCYTES # BLD AUTO: 0.8 K/UL
MONOCYTES NFR BLD: 9.5 %
NEUTROPHILS # BLD AUTO: 3.7 K/UL
NEUTROPHILS NFR BLD: 41.6 %
NITRITE UR QL STRIP: NEGATIVE
OPIATES UR QL SCN: NEGATIVE
PCP UR QL SCN>25 NG/ML: NEGATIVE
PH UR STRIP: 6 [PH] (ref 5–8)
PLATELET # BLD AUTO: 230 K/UL
PMV BLD AUTO: 9.3 FL
POTASSIUM SERPL-SCNC: 4.1 MMOL/L
PROT SERPL-MCNC: 8.2 G/DL
PROT UR QL STRIP: NEGATIVE
RBC # BLD AUTO: 5.81 M/UL
SALICYLATES SERPL-MCNC: <5 MG/DL
SODIUM SERPL-SCNC: 140 MMOL/L
SP GR UR STRIP: <=1.005 (ref 1–1.03)
TOXICOLOGY INFORMATION: NORMAL
TSH SERPL DL<=0.005 MIU/L-ACNC: 1.42 UIU/ML
URN SPEC COLLECT METH UR: ABNORMAL
UROBILINOGEN UR STRIP-ACNC: NEGATIVE EU/DL
WBC # BLD AUTO: 8.81 K/UL

## 2018-02-15 PROCEDURE — 80307 DRUG TEST PRSMV CHEM ANLYZR: CPT

## 2018-02-15 PROCEDURE — 96361 HYDRATE IV INFUSION ADD-ON: CPT

## 2018-02-15 PROCEDURE — 80053 COMPREHEN METABOLIC PANEL: CPT

## 2018-02-15 PROCEDURE — 93010 ELECTROCARDIOGRAM REPORT: CPT | Mod: ,,, | Performed by: INTERNAL MEDICINE

## 2018-02-15 PROCEDURE — 80320 DRUG SCREEN QUANTALCOHOLS: CPT

## 2018-02-15 PROCEDURE — G0425 INPT/ED TELECONSULT30: HCPCS | Mod: GT,,, | Performed by: PSYCHIATRY & NEUROLOGY

## 2018-02-15 PROCEDURE — 85025 COMPLETE CBC W/AUTO DIFF WBC: CPT

## 2018-02-15 PROCEDURE — 96374 THER/PROPH/DIAG INJ IV PUSH: CPT

## 2018-02-15 PROCEDURE — 81003 URINALYSIS AUTO W/O SCOPE: CPT

## 2018-02-15 PROCEDURE — 93005 ELECTROCARDIOGRAM TRACING: CPT

## 2018-02-15 PROCEDURE — 63600175 PHARM REV CODE 636 W HCPCS: Performed by: EMERGENCY MEDICINE

## 2018-02-15 PROCEDURE — 84443 ASSAY THYROID STIM HORMONE: CPT

## 2018-02-15 PROCEDURE — 25000003 PHARM REV CODE 250: Performed by: EMERGENCY MEDICINE

## 2018-02-15 PROCEDURE — 99285 EMERGENCY DEPT VISIT HI MDM: CPT | Mod: 25

## 2018-02-15 PROCEDURE — 80329 ANALGESICS NON-OPIOID 1 OR 2: CPT

## 2018-02-15 RX ADMIN — SODIUM CHLORIDE 1000 ML: 0.9 INJECTION, SOLUTION INTRAVENOUS at 09:02

## 2018-02-15 RX ADMIN — LORAZEPAM 2 MG: 2 INJECTION, SOLUTION INTRAMUSCULAR; INTRAVENOUS at 10:02

## 2018-02-16 VITALS
HEART RATE: 83 BPM | DIASTOLIC BLOOD PRESSURE: 82 MMHG | HEIGHT: 74 IN | BODY MASS INDEX: 28.35 KG/M2 | RESPIRATION RATE: 16 BRPM | WEIGHT: 220.88 LBS | SYSTOLIC BLOOD PRESSURE: 115 MMHG | OXYGEN SATURATION: 97 % | TEMPERATURE: 98 F

## 2018-02-16 LAB
ETHANOL SERPL-MCNC: 146 MG/DL
ETHANOL SERPL-MCNC: 151 MG/DL
ETHANOL SERPL-MCNC: 99 MG/DL

## 2018-02-16 PROCEDURE — 80320 DRUG SCREEN QUANTALCOHOLS: CPT

## 2018-02-16 PROCEDURE — 80320 DRUG SCREEN QUANTALCOHOLS: CPT | Mod: 91

## 2018-02-16 NOTE — ED PROVIDER NOTES
"SCRIBE #1 NOTE: I, Monae Buenrostro, am scribing for, and in the presence of, Philipp Sorto MD. I have scribed the entire note.      History      Chief Complaint   Patient presents with    Alcohol Intoxication     reports drinking a fifth. family states he drank an entire bottle of listerine. c/o R shoulder pain after falling       Review of patient's allergies indicates:   Allergen Reactions    Benadryl [diphenhydramine hcl]      "black out"    Codeine Hives        HPI   HPI    2/15/2018, 9:01 PM   History obtained from the patient and wife  HPI limited, pt is intoxicated      History of Present Illness: Huma Ronquillo is a 52 y.o. male patient who presents to the Emergency Department for further evaluation after pt drank 2 large bottles of mouthwash. Pt's wife states pt drank 1 bottle over the course of the day and 1 bottle between 1900 and 2000. Wife states pt has been having episodes of binge-drinking at least once per month for the last 20 years. Pt reports he drank the mouthwash because it is cheaper than alcohol. Pt reports this time of year is always tough for him due to the anniversary of his mother's death. Pt reports a dysphoric mood for the past 2-3 weeks. Pt denies SI, HI, abd pain, n/v, and falls. No further complaints or concerns.        Arrival mode: Personal vehicle     PCP: Bruno Marquis MD       Past Medical History:  Past Medical History:   Diagnosis Date    Bipolar disorder     Cardiovascular disease     Depression        Past Surgical History:  Past Surgical History:   Procedure Laterality Date    COLONOSCOPY  2016    ROTATOR CUFF REPAIR Right 2013    TIBIA FRACTURE SURGERY Left          Family History:  Family History   Problem Relation Age of Onset    Cancer Mother      Lung- smoker    Heart attack Father        Social History:  Social History     Social History Main Topics    Smoking status: Never Smoker    Smokeless tobacco: Former User     Quit date: 1/1/1992    Alcohol use No "    Drug use: No    Sexual activity: Not on file       ROS   Review of Systems   Reason unable to perform ROS: Pt intoxicated. ROS limited.   Constitutional:        (+) EtOH   Gastrointestinal: Negative for abdominal pain, nausea and vomiting.   Psychiatric/Behavioral: Positive for dysphoric mood. Negative for suicidal ideas.        (-) HI     Physical Exam      Initial Vitals [02/15/18 2040]   BP Pulse Resp Temp SpO2   125/81 72 18 98.3 °F (36.8 °C) 95 %      MAP       95.67          Physical Exam  Nursing Notes and Vital Signs Reviewed.  Constitutional: Patient is in no acute distress. Well-developed and well-nourished.  Head: Atraumatic. Normocephalic.  Eyes: PERRL. EOM intact. Conjunctivae are not pale. No scleral icterus.  ENT: Mucous membranes are moist. Oropharynx is clear and symmetric.    Neck: Supple. Full ROM. No lymphadenopathy.  Cardiovascular: Regular rate. Regular rhythm. No murmurs, rubs, or gallops. Distal pulses are 2+ and symmetric.  Pulmonary/Chest: No respiratory distress. Clear to auscultation bilaterally. No wheezing or rales.  Abdominal: Soft and non-distended.  There is no tenderness.  No rebound, guarding, or rigidity. Good bowel sounds.  Genitourinary: No CVA tenderness  Musculoskeletal: Moves all extremities. No obvious deformities. No edema. No calf tenderness.  Skin: Warm and dry.  Neurological:  Alert, awake, and appropriate.  Normal speech.  No acute focal neurological deficits are appreciated.   Psychiatric  Level of Consciousness: alert  Orientation: oriented to person, place and time  Grooming: in hospital gown  Psychomotor Behavior: no agitation  Speech: normal in rate, rhythm and volume  Language: uses words appropriately  Mood: Depressed  Affect: Depressed  Thought Process: LInear  Associations: Tight  Thought Content: No AH  Memory: Intact  Attention: intact to interview  Fund of Knowledge: appears adequate  Insight: Poor  Judgement: Poor    ED Course    Procedures  ED Vital  "Signs:  Vitals:    02/15/18 2040   BP: 125/81   Pulse: 72   Resp: 18   Temp: 98.3 °F (36.8 °C)   TempSrc: Oral   SpO2: 95%   Weight: 100.2 kg (220 lb 14.4 oz)   Height: 6' 2" (1.88 m)       Abnormal Lab Results:  Labs Reviewed   CBC W/ AUTO DIFFERENTIAL - Abnormal; Notable for the following:        Result Value    Hemoglobin 18.8 (*)     MCH 32.4 (*)     All other components within normal limits   COMPREHENSIVE METABOLIC PANEL - Abnormal; Notable for the following:     CO2 21 (*)     All other components within normal limits   URINALYSIS - Abnormal; Notable for the following:     Specific Gravity, UA <=1.005 (*)     All other components within normal limits   ALCOHOL,MEDICAL (ETHANOL) - Abnormal; Notable for the following:     Alcohol, Medical, Serum 286 (*)     All other components within normal limits   ACETAMINOPHEN LEVEL - Abnormal; Notable for the following:     Acetaminophen (Tylenol), Serum <3.0 (*)     All other components within normal limits   SALICYLATE LEVEL - Abnormal; Notable for the following:     Salicylate Lvl <5.0 (*)     All other components within normal limits   TSH   DRUG SCREEN PANEL, URINE EMERGENCY        All Lab Results:  - none    Vitals:    02/15/18 2040   BP: 125/81   Pulse: 72   Resp: 18   Temp: 98.3 °F (36.8 °C)   TempSrc: Oral   SpO2: 95%   Weight: 100.2 kg (220 lb 14.4 oz)   Height: 6' 2" (1.88 m)       Results for orders placed or performed during the hospital encounter of 02/15/18   CBC auto differential   Result Value Ref Range    WBC 8.81 3.90 - 12.70 K/uL    RBC 5.81 4.60 - 6.20 M/uL    Hemoglobin 18.8 (H) 14.0 - 18.0 g/dL    Hematocrit 52.3 40.0 - 54.0 %    MCV 90 82 - 98 fL    MCH 32.4 (H) 27.0 - 31.0 pg    MCHC 35.9 32.0 - 36.0 g/dL    RDW 14.2 11.5 - 14.5 %    Platelets 230 150 - 350 K/uL    MPV 9.3 9.2 - 12.9 fL    Gran # (ANC) 3.7 1.8 - 7.7 K/uL    Lymph # 4.1 1.0 - 4.8 K/uL    Mono # 0.8 0.3 - 1.0 K/uL    Eos # 0.2 0.0 - 0.5 K/uL    Baso # 0.05 0.00 - 0.20 K/uL    Gran% " 41.6 38.0 - 73.0 %    Lymph% 46.5 18.0 - 48.0 %    Mono% 9.5 4.0 - 15.0 %    Eosinophil% 1.8 0.0 - 8.0 %    Basophil% 0.6 0.0 - 1.9 %    Differential Method Automated    Comprehensive metabolic panel   Result Value Ref Range    Sodium 140 136 - 145 mmol/L    Potassium 4.1 3.5 - 5.1 mmol/L    Chloride 107 95 - 110 mmol/L    CO2 21 (L) 23 - 29 mmol/L    Glucose 86 70 - 110 mg/dL    BUN, Bld 7 6 - 20 mg/dL    Creatinine 1.2 0.5 - 1.4 mg/dL    Calcium 10.0 8.7 - 10.5 mg/dL    Total Protein 8.2 6.0 - 8.4 g/dL    Albumin 4.5 3.5 - 5.2 g/dL    Total Bilirubin 0.6 0.1 - 1.0 mg/dL    Alkaline Phosphatase 67 55 - 135 U/L    AST 32 10 - 40 U/L    ALT 22 10 - 44 U/L    Anion Gap 12 8 - 16 mmol/L    eGFR if African American >60 >60 mL/min/1.73 m^2    eGFR if non African American >60 >60 mL/min/1.73 m^2   TSH   Result Value Ref Range    TSH 1.415 0.400 - 4.000 uIU/mL   Urinalysis - clean catch   Result Value Ref Range    Specimen UA Urine, Clean Catch     Color, UA Yellow Yellow, Straw, Mendy    Appearance, UA Clear Clear    pH, UA 6.0 5.0 - 8.0    Specific Gravity, UA <=1.005 (A) 1.005 - 1.030    Protein, UA Negative Negative    Glucose, UA Negative Negative    Ketones, UA Negative Negative    Bilirubin (UA) Negative Negative    Occult Blood UA Negative Negative    Nitrite, UA Negative Negative    Urobilinogen, UA Negative <2.0 EU/dL    Leukocytes, UA Negative Negative   Drug screen panel, emergency   Result Value Ref Range    Benzodiazepines Negative     Methadone metabolites Negative     Cocaine (Metab.) Negative     Opiate Scrn, Ur Negative     Barbiturate Screen, Ur Negative     Amphetamine Screen, Ur Negative     THC Negative     Phencyclidine Negative     Creatinine, Random Ur 23.1 23.0 - 375.0 mg/dL    Toxicology Information SEE COMMENT    Ethanol   Result Value Ref Range    Alcohol, Medical, Serum 286 (H) <10 mg/dL   Acetaminophen level   Result Value Ref Range    Acetaminophen (Tylenol), Serum <3.0 (L) 10.0 - 20.0 ug/mL    Salicylate level   Result Value Ref Range    Salicylate Lvl <5.0 (L) 15.0 - 30.0 mg/dL        Imaging Results    None            The above test results and vital signs have been reviewed by the physician.      A PEC hold placed on patient.  At this time, the patient is resting comfortably and is in no acute distress. There are no acute behavior issues present at this time.  The patient has been medically cleared and is awaiting placement/transfer to a psychiatric facility for further evaluation.     [id number] Transfer of Care: Psychiatric service(s) is/are not available at this facility. Will transfer patient to Psychiatric Facility for Psychiatric Evaluation.  Notified patient and family of need for transfer.  They understand and agree with the plan as discussed. Answered questions at this time.    Gravely disabled and requires inpatient care.  Pt was evaluated by Dr. Roberts.    Imaging Results:  Imaging Results    None        The EKG was ordered, reviewed, and independently interpreted by the ED provider.  Interpretation time: 20:54  Rate: 71 BPM  Rhythm: normal sinus rhythm  Interpretation: No acute ST changes. No STEMI.         The Emergency Provider reviewed the vital signs and test results, which are outlined above.    ED Discussion     9:02 PM: RN called poison control. Poison control recommends treating pt as you would treat a normal EtOH toxicity.     9:38 PM: Discussed pt's case with Dr. Roberts (TelePsych) who recommends a PEC. Dr. Roberts states pt has OD'ed multiple times in the past. Dr. Roberts states pt normally drinks mouth wash leading up to his overdose.     9:39 PM: The PEC hold has been issued by Dr. Sorto at this time, pt is gravely disabled.        ED Medication(s):  Medications   sodium chloride 0.9% bolus 1,000 mL (0 mLs Intravenous Stopped 2/15/18 2211)   lorazepam (ATIVAN) injection 2 mg (2 mg Intravenous Given 2/15/18 2212)       New Prescriptions    No medications on file              Medical Decision Making              Scribe Attestation:   Scribe #1: I performed the above scribed service and the documentation accurately describes the services I performed. I attest to the accuracy of the note.    Attending:   Physician Attestation Statement for Scribe #1: I, Philipp Sorto MD, personally performed the services described in this documentation, as scribed by Monae Buenrostro, in my presence, and it is both accurate and complete.          Clinical Impression       ICD-10-CM ICD-9-CM   1. Alcoholic intoxication without complication F10.920 305.00   2. Altered mental status R41.82 780.97   3. Bipolar affective disorder, remission status unspecified F31.9 296.80       Disposition:   Disposition: Transferred         Philipp Sorto MD  02/15/18 2251       Philipp Sorto MD  02/15/18 2252

## 2018-02-16 NOTE — ED NOTES
Patient transported out of facility by two personnel transportation company, , and RN at this time.

## 2018-02-16 NOTE — ED NOTES
Pt report received from Zeina LOUIS. Pt care assumed at this time. Pt sleeping in bed, respirations even and unlabored. Pt in grey gown and yellow socks with sitterDilia, at bedside performing q15min checks. Belongings locked in PEC cabinet 27. Will continue to monitor.

## 2018-02-16 NOTE — ED NOTES
"Pt sleeping in bed, respirations even and unlabored. Pt easily arousable and oriented x4 with c/o chronic L shoulder pain. Pt aware he is under PEC and that he will be transferred to a psychiatric facility once he has been cleared for transfer and is accepted to a facility. Pt denies current SI/HI and states he has been feeling depressed. Pt also reports history of bipolar depression, alcohol abuse, previous suicide attempt by overdose, and previous psychiatric hospitalizations. Pt admits to drinking 2 bottles of listerine yesterday "to get drunk." Pt remains in silva gown and yellow socks with Dilia joy, at bedside performing q15min checks. Will continue to monitor.  "

## 2018-02-16 NOTE — ED NOTES
Spoke with Arabella at poison control who instructs to treat pt as alcohol toxicity, administer NS, and monitor. Dr Sorto notified.

## 2018-02-16 NOTE — ED NOTES
Patient has been accepted at [facility] by Dr. Hinds; reports is to be called at [phone].

## 2018-02-16 NOTE — ED NOTES
Patient identifiers verified and correct for Huma Ronquillo.    LOC: The patient is awake, alert and aware of environment with an appropriate affect, the patient is oriented x 3 and speaking appropriately.  APPEARANCE: Patient resting comfortably and in no acute distress, patient is clean and well groomed, patient's clothing is properly fastened.  SKIN: The skin is warm and dry, color consistent with ethnicity, patient has normal skin turgor and moist mucus membranes, skin intact, no breakdown or bruising noted.  MUSCULOSKELETAL: Patient moving all extremities spontaneously.  RESPIRATORY: Airway is open and patent, respirations are spontaneous.  CARDIAC: Patient has a normal rate, no periphreal edema noted, capillary refill < 3 seconds.  ABDOMEN: Soft and non tender to palpation.

## 2018-02-16 NOTE — PROVIDER PROGRESS NOTES - EMERGENCY DEPT.
Encounter Date: 2/15/2018    ED Physician Progress Notes       SCRIBE NOTE: I, Raymundo Heredia, am scribing for, and in the presence of,  Lulu Chauhan MD.  Physician Statement: I, Lulu Chauhan MD, personally performed the services described in this documentation as scribed by Raymundo Heredia in my presence, and it is both accurate and complete.          11:38 AM: Pt has been accepted by Dr. Hinds at [facility] in [place].

## 2018-02-16 NOTE — ED NOTES
Report received from MISSY Burrows. Care of patient assumed at this time.    The patient is resting quietly, eyes closed, arouses easily to stimuli. Airway is open and patent, respirations are spontaneous, normal respiratory effort and rate noted, skin warm and dry, appearance: in no acute distress and resting comfortably. Sitter at bedside with direct visualization of pt. Sitter filling out 15 minute flow sheet. NAD at this time. Will continue to monitor.

## 2018-02-16 NOTE — ED NOTES
Pt sleeping in bed, respirations even and unlabored. Pt easily arousable and oriented x4 with c/o chronic L shoulder pain. Pt with no other complaint at this time and remains in grey gown and yellow socks with Gonzalo joy at bedside performing q15min checks. Will continue to monitor.

## 2018-02-16 NOTE — ED NOTES
Pt states he drank one large bottle of listerine between 10am and 7pm and another large bottle of listerine between 7pm and 8pm. Pt states he drank listerine because he did not have any alcohol in the house. Spouse states pt has done this before. Dr Sorto notified.

## 2018-02-16 NOTE — ED NOTES
Pt sleeping in bed, respirations even and unlabored. Pt remains in grey gown and yellow socks with Gonzalo joy at bedside performing q15min checks. Will continue to monitor.
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
## 2018-04-25 ENCOUNTER — TELEPHONE (OUTPATIENT)
Dept: GASTROENTEROLOGY | Facility: CLINIC | Age: 53
End: 2018-04-25

## 2018-05-15 ENCOUNTER — CLINICAL SUPPORT (OUTPATIENT)
Dept: GASTROENTEROLOGY | Facility: CLINIC | Age: 53
End: 2018-05-15
Payer: MEDICARE

## 2018-05-15 VITALS
SYSTOLIC BLOOD PRESSURE: 134 MMHG | BODY MASS INDEX: 28.52 KG/M2 | HEIGHT: 74 IN | DIASTOLIC BLOOD PRESSURE: 82 MMHG | WEIGHT: 222.25 LBS | HEART RATE: 78 BPM

## 2018-05-15 DIAGNOSIS — Z23 NEED FOR HEPATITIS A AND B VACCINATION: Primary | ICD-10-CM

## 2018-05-15 PROCEDURE — 99499 UNLISTED E&M SERVICE: CPT | Mod: S$GLB,,, | Performed by: NURSE PRACTITIONER

## 2018-05-15 PROCEDURE — G0010 ADMIN HEPATITIS B VACCINE: HCPCS | Mod: S$GLB,,, | Performed by: NURSE PRACTITIONER

## 2018-05-15 PROCEDURE — 90746 HEPB VACCINE 3 DOSE ADULT IM: CPT | Mod: S$GLB,,, | Performed by: NURSE PRACTITIONER

## 2018-05-15 PROCEDURE — 99999 PR PBB SHADOW E&M-EST. PATIENT-LVL III: CPT | Mod: PBBFAC,,,

## 2018-05-15 NOTE — PROGRESS NOTES
Patient received 1 mL Hep B vaccine in right deltoid. Tolerated well. Reports pain 0/10. No redness, bruising, or swelling. Denies headache, dizziness, or nausea. Band aid applied. Monitored for 15 min. No reaction to vaccination noted.

## 2018-10-29 ENCOUNTER — HOSPITAL ENCOUNTER (OUTPATIENT)
Dept: RADIOLOGY | Facility: HOSPITAL | Age: 53
Discharge: HOME | End: 2018-10-29
Attending: FAMILY MEDICINE
Payer: MEDICARE

## 2018-10-29 ENCOUNTER — OFFICE VISIT (OUTPATIENT)
Dept: INTERNAL MEDICINE | Facility: CLINIC | Age: 53
End: 2018-10-29
Payer: MEDICARE

## 2018-10-29 VITALS
DIASTOLIC BLOOD PRESSURE: 70 MMHG | HEIGHT: 74 IN | BODY MASS INDEX: 26.12 KG/M2 | HEART RATE: 97 BPM | WEIGHT: 203.5 LBS | SYSTOLIC BLOOD PRESSURE: 104 MMHG | OXYGEN SATURATION: 97 % | TEMPERATURE: 98 F

## 2018-10-29 DIAGNOSIS — R68.84 PAIN IN BONE OF JAW: Primary | ICD-10-CM

## 2018-10-29 DIAGNOSIS — R68.84 PAIN IN BONE OF JAW: ICD-10-CM

## 2018-10-29 PROBLEM — I25.10 CAD (CORONARY ARTERY DISEASE): Status: ACTIVE | Noted: 2017-01-30

## 2018-10-29 PROBLEM — B19.20 HEPATITIS C: Status: ACTIVE | Noted: 2017-05-01

## 2018-10-29 PROCEDURE — 99213 OFFICE O/P EST LOW 20 MIN: CPT | Mod: S$PBB,,, | Performed by: FAMILY MEDICINE

## 2018-10-29 PROCEDURE — 70110 X-RAY EXAM OF JAW 4/> VIEWS: CPT | Mod: TC

## 2018-10-29 PROCEDURE — 99214 OFFICE O/P EST MOD 30 MIN: CPT | Mod: PBBFAC,25 | Performed by: FAMILY MEDICINE

## 2018-10-29 PROCEDURE — 70110 X-RAY EXAM OF JAW 4/> VIEWS: CPT | Mod: 26,,, | Performed by: RADIOLOGY

## 2018-10-29 PROCEDURE — 3008F BODY MASS INDEX DOCD: CPT | Mod: ,,, | Performed by: FAMILY MEDICINE

## 2018-10-29 PROCEDURE — 99999 PR PBB SHADOW E&M-EST. PATIENT-LVL IV: CPT | Mod: PBBFAC,,, | Performed by: FAMILY MEDICINE
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
## 2018-11-14 ENCOUNTER — TELEPHONE (OUTPATIENT)
Dept: INTERNAL MEDICINE | Facility: CLINIC | Age: 53
End: 2018-11-14

## 2018-11-14 NOTE — TELEPHONE ENCOUNTER
----- Message from Nedra Munguia sent at 11/14/2018 12:32 PM CST -----  Contact: patient  Patient needs to schedule pre op clearance.     Can be reached at [phone]    Thanks  KB

## 2018-11-14 NOTE — TELEPHONE ENCOUNTER
Pt's wife stated pt needs a pre-op clearance appt for a rotator cuff surgery scheduled for 12/26/18 with Dr. Derek Carmichael.  Scheduled appt for 12/3/18.

## 2018-12-04 ENCOUNTER — OFFICE VISIT (OUTPATIENT)
Dept: INTERNAL MEDICINE | Facility: CLINIC | Age: 53
End: 2018-12-04
Payer: MEDICARE

## 2018-12-04 ENCOUNTER — LAB VISIT (OUTPATIENT)
Dept: LAB | Facility: HOSPITAL | Age: 53
End: 2018-12-04
Attending: INTERNAL MEDICINE
Payer: MEDICARE

## 2018-12-04 VITALS
TEMPERATURE: 99 F | DIASTOLIC BLOOD PRESSURE: 82 MMHG | WEIGHT: 205.25 LBS | HEART RATE: 69 BPM | HEIGHT: 74 IN | OXYGEN SATURATION: 97 % | BODY MASS INDEX: 26.34 KG/M2 | SYSTOLIC BLOOD PRESSURE: 118 MMHG

## 2018-12-04 DIAGNOSIS — Z78.9 STATIN INTOLERANCE: ICD-10-CM

## 2018-12-04 DIAGNOSIS — I25.10 CORONARY ARTERY DISEASE, ANGINA PRESENCE UNSPECIFIED, UNSPECIFIED VESSEL OR LESION TYPE, UNSPECIFIED WHETHER NATIVE OR TRANSPLANTED HEART: Primary | ICD-10-CM

## 2018-12-04 DIAGNOSIS — F31.9 BIPOLAR AFFECTIVE DISORDER, REMISSION STATUS UNSPECIFIED: ICD-10-CM

## 2018-12-04 DIAGNOSIS — Z01.818 PREOP EXAMINATION: ICD-10-CM

## 2018-12-04 PROBLEM — B19.20 HEPATITIS C: Status: RESOLVED | Noted: 2017-05-01 | Resolved: 2018-12-04

## 2018-12-04 LAB
ANION GAP SERPL CALC-SCNC: 9 MMOL/L
BASOPHILS # BLD AUTO: 0.05 K/UL
BASOPHILS NFR BLD: 0.7 %
BUN SERPL-MCNC: 15 MG/DL
CALCIUM SERPL-MCNC: 10.2 MG/DL
CHLORIDE SERPL-SCNC: 102 MMOL/L
CO2 SERPL-SCNC: 26 MMOL/L
CREAT SERPL-MCNC: 1.1 MG/DL
DIFFERENTIAL METHOD: ABNORMAL
EOSINOPHIL # BLD AUTO: 0.1 K/UL
EOSINOPHIL NFR BLD: 1.7 %
ERYTHROCYTE [DISTWIDTH] IN BLOOD BY AUTOMATED COUNT: 13.3 %
EST. GFR  (AFRICAN AMERICAN): >60 ML/MIN/1.73 M^2
EST. GFR  (NON AFRICAN AMERICAN): >60 ML/MIN/1.73 M^2
GLUCOSE SERPL-MCNC: 87 MG/DL
HCT VFR BLD AUTO: 41.6 %
HGB BLD-MCNC: 14.4 G/DL
IMM GRANULOCYTES # BLD AUTO: 0.02 K/UL
IMM GRANULOCYTES NFR BLD AUTO: 0.3 %
LYMPHOCYTES # BLD AUTO: 2.7 K/UL
LYMPHOCYTES NFR BLD: 34.6 %
MCH RBC QN AUTO: 32.7 PG
MCHC RBC AUTO-ENTMCNC: 34.6 G/DL
MCV RBC AUTO: 94 FL
MONOCYTES # BLD AUTO: 0.8 K/UL
MONOCYTES NFR BLD: 9.9 %
NEUTROPHILS # BLD AUTO: 4.1 K/UL
NEUTROPHILS NFR BLD: 52.8 %
NRBC BLD-RTO: 0 /100 WBC
PLATELET # BLD AUTO: 239 K/UL
PMV BLD AUTO: 9.6 FL
POTASSIUM SERPL-SCNC: 4.4 MMOL/L
RBC # BLD AUTO: 4.41 M/UL
SODIUM SERPL-SCNC: 137 MMOL/L
WBC # BLD AUTO: 7.68 K/UL

## 2018-12-04 PROCEDURE — 99999 PR PBB SHADOW E&M-EST. PATIENT-LVL III: CPT | Mod: PBBFAC,,, | Performed by: INTERNAL MEDICINE

## 2018-12-04 PROCEDURE — 99214 OFFICE O/P EST MOD 30 MIN: CPT | Mod: S$GLB,,, | Performed by: INTERNAL MEDICINE

## 2018-12-04 PROCEDURE — 36415 COLL VENOUS BLD VENIPUNCTURE: CPT | Mod: PO

## 2018-12-04 PROCEDURE — 85025 COMPLETE CBC W/AUTO DIFF WBC: CPT

## 2018-12-04 PROCEDURE — 3008F BODY MASS INDEX DOCD: CPT | Mod: S$GLB,,, | Performed by: INTERNAL MEDICINE

## 2018-12-04 PROCEDURE — 80048 BASIC METABOLIC PNL TOTAL CA: CPT

## 2018-12-04 NOTE — PROGRESS NOTES
Subjective:      Patient ID: Huma Ronquillo is a 53 y.o. male.    Chief Complaint: Pre-op Exam (rotator cuff repair)    HPI     54 yo with   Past Medical History:   Diagnosis Date    Bipolar disorder     Cardiovascular disease     Depression     Hepatitis C 5/1/2017     Patient Active Problem List   Diagnosis    Bipolar disorder    CAD (coronary artery disease)    Statin intolerance     Here today for preop for left rotator cuff sx planned for dec 26. He is able to walk up at least 2 flights of stairs without dyspnea or CP. No chf, dm, ckd, cva, RA.  Has h/o cad with stent placement, 2012 and 2014. Cardiologist is Dr. AHMET Cool. Has appt with him next week.       Past Surgical History:   Procedure Laterality Date    COLONOSCOPY  2016    ROTATOR CUFF REPAIR Right 2013    TIBIA FRACTURE SURGERY Left    right shoulder    Outpatient and Clinic-Administered Medications    tramadol (ULTRAM) 50 mg tablet         metoprolol tartrate (LOPRESSOR) 25 MG tablet 1 tablet, 2 times daily        clopidogrel (PLAVIX) 75 mg tablet 1 tablet, Daily        isosorbide mononitrate (IMDUR) 30 MG 24 hr tablet 1 tablet, Daily        Summary: Take 1 tablet by mouth once daily.   Dose, Route, Frequency: 1 tablet, Oral, Daily  Start: 12/22/2016  Ord/Sold: 1/30/2017 (O)  Report  Adh:   Long-term:   Med Dose History  EditReorderDiscontinue       Patient Sig: Take 1 tablet by mouth once daily.       Ordered on: 1/30/2017       Authorized by: PROVIDER, HISTORICAL       TRINTELLIX 20 mg Tab 1 tablet, Daily        LATUDA 40 mg Tab tablet 1 tablet, Nightly        trihexyphenidyl (ARTANE) 2 MG tablet 1 tablet, Nightly        meclizine (ANTIVERT) 25 mg tablet 25 mg, Every 6 hours PRN        trazodone (DESYREL) 100 MG tablet 25 mg, Nightly             aspirin (ECOTRIN) 81 MG EC tablet 81 mg, Daily         family history includes Cancer in his mother; Heart attack in his father.    Review of patient's allergies indicates:   Allergen Reactions  "   Benadryl [diphenhydramine hcl]      "black out"    Codeine Hives     Social History     Socioeconomic History    Marital status:      Spouse name: Not on file    Number of children: Not on file    Years of education: Not on file    Highest education level: Not on file   Social Needs    Financial resource strain: Not on file    Food insecurity - worry: Not on file    Food insecurity - inability: Not on file    Transportation needs - medical: Not on file    Transportation needs - non-medical: Not on file   Occupational History    Not on file   Tobacco Use    Smoking status: Never Smoker    Smokeless tobacco: Former User   Substance and Sexual Activity    Alcohol use: Yes    Drug use: No    Sexual activity: Not on file   Other Topics Concern    Not on file   Social History Narrative    Not on file        Review of Systems   Constitutional: Negative for chills and fever.   HENT: Negative for ear pain and sore throat.    Respiratory: Negative for cough.    Cardiovascular: Negative for chest pain.   Gastrointestinal: Negative for abdominal pain and blood in stool.   Genitourinary: Negative for dysuria and hematuria.   Neurological: Negative for seizures and syncope.     Lab Visit on 12/04/2018   Component Date Value Ref Range Status    Sodium 12/04/2018 137  136 - 145 mmol/L Final    Potassium 12/04/2018 4.4  3.5 - 5.1 mmol/L Final    Chloride 12/04/2018 102  95 - 110 mmol/L Final    CO2 12/04/2018 26  23 - 29 mmol/L Final    Glucose 12/04/2018 87  70 - 110 mg/dL Final    BUN, Bld 12/04/2018 15  6 - 20 mg/dL Final    Creatinine 12/04/2018 1.1  0.5 - 1.4 mg/dL Final    Calcium 12/04/2018 10.2  8.7 - 10.5 mg/dL Final    Anion Gap 12/04/2018 9  8 - 16 mmol/L Final    eGFR if African American 12/04/2018 >60.0  >60 mL/min/1.73 m^2 Final    eGFR if non African American 12/04/2018 >60.0  >60 mL/min/1.73 m^2 Final    Comment: Calculation used to obtain the estimated glomerular " "filtration  rate (eGFR) is the CKD-EPI equation.       WBC 12/04/2018 7.68  3.90 - 12.70 K/uL Final    RBC 12/04/2018 4.41* 4.60 - 6.20 M/uL Final    Hemoglobin 12/04/2018 14.4  14.0 - 18.0 g/dL Final    Hematocrit 12/04/2018 41.6  40.0 - 54.0 % Final    MCV 12/04/2018 94  82 - 98 fL Final    MCH 12/04/2018 32.7* 27.0 - 31.0 pg Final    MCHC 12/04/2018 34.6  32.0 - 36.0 g/dL Final    RDW 12/04/2018 13.3  11.5 - 14.5 % Final    Platelets 12/04/2018 239  150 - 350 K/uL Final    MPV 12/04/2018 9.6  9.2 - 12.9 fL Final    Immature Granulocytes 12/04/2018 0.3  0.0 - 0.5 % Final    Gran # (ANC) 12/04/2018 4.1  1.8 - 7.7 K/uL Final    Immature Grans (Abs) 12/04/2018 0.02  0.00 - 0.04 K/uL Final    Comment: Mild elevation in immature granulocytes is non specific and   can be seen in a variety of conditions including stress response,   acute inflammation, trauma and pregnancy. Correlation with other   laboratory and clinical findings is essential.      Lymph # 12/04/2018 2.7  1.0 - 4.8 K/uL Final    Mono # 12/04/2018 0.8  0.3 - 1.0 K/uL Final    Eos # 12/04/2018 0.1  0.0 - 0.5 K/uL Final    Baso # 12/04/2018 0.05  0.00 - 0.20 K/uL Final    nRBC 12/04/2018 0  0 /100 WBC Final    Gran% 12/04/2018 52.8  38.0 - 73.0 % Final    Lymph% 12/04/2018 34.6  18.0 - 48.0 % Final    Mono% 12/04/2018 9.9  4.0 - 15.0 % Final    Eosinophil% 12/04/2018 1.7  0.0 - 8.0 % Final    Basophil% 12/04/2018 0.7  0.0 - 1.9 % Final    Differential Method 12/04/2018 Automated   Final       Objective:   /82 (BP Location: Right arm, Patient Position: Sitting)   Pulse 69   Temp 98.5 °F (36.9 °C) (Oral)   Ht 6' 2.4" (1.89 m)   Wt 93.1 kg (205 lb 4 oz)   SpO2 97%   BMI 26.07 kg/m²     Physical Exam   Constitutional: He is oriented to person, place, and time. He appears well-developed and well-nourished. No distress.   HENT:   Head: Normocephalic and atraumatic.   Mouth/Throat: Oropharynx is clear and moist.   Eyes: EOM are " normal. Pupils are equal, round, and reactive to light.   Neck: Neck supple. Carotid bruit is not present. No thyromegaly present.   Cardiovascular: Normal rate and regular rhythm.   Pulmonary/Chest: Breath sounds normal. He has no wheezes. He has no rales.   Abdominal: Soft. Bowel sounds are normal. There is no tenderness.   Musculoskeletal: He exhibits no edema.   Lymphadenopathy:     He has no cervical adenopathy.   Neurological: He is alert and oriented to person, place, and time.   Skin: Skin is warm and dry.   Psychiatric: He has a normal mood and affect. His behavior is normal.       Assessment:     1. Coronary artery disease, angina presence unspecified, unspecified vessel or lesion type, unspecified whether native or transplanted heart    2. Statin intolerance    3. Bipolar affective disorder, remission status unspecified    4. Preop examination      Plan:   Coronary artery disease, angina presence unspecified, unspecified vessel or lesion type, unspecified whether native or transplanted heart    Statin intolerance    Bipolar affective disorder, remission status unspecified    Preop examination  -     Basic metabolic panel; Future; Expected date: 12/04/2018  -     CBC auto differential; Future; Expected date: 12/04/2018      Pt is ok for surgery from a general medicine standpoint. Please see cardiology note for cardiac clearance.         Problem List Items Addressed This Visit        Psychiatric    Bipolar disorder       Cardiac/Vascular    CAD (coronary artery disease) - Primary       Other    Statin intolerance      Other Visit Diagnoses     Preop examination        Relevant Orders    Basic metabolic panel (Completed)    CBC auto differential (Completed)          Follow-up in about 6 months (around 6/4/2019), or if symptoms worsen or fail to improve.

## 2018-12-07 ENCOUNTER — TELEPHONE (OUTPATIENT)
Dept: INTERNAL MEDICINE | Facility: CLINIC | Age: 53
End: 2018-12-07

## 2018-12-19 ENCOUNTER — TELEPHONE (OUTPATIENT)
Dept: INTERNAL MEDICINE | Facility: CLINIC | Age: 53
End: 2018-12-19

## 2018-12-19 NOTE — TELEPHONE ENCOUNTER
----- Message from Anabela Aranda sent at 12/19/2018  3:20 PM CST -----  Contact: kori troncoso   Requesting a call back regarding pre op clearance needed.please fax paperwork [phone]. please call back at [phone].      Thanks,  Anabela Aranda

## 2019-08-21 ENCOUNTER — TELEPHONE (OUTPATIENT)
Dept: INTERNAL MEDICINE | Facility: CLINIC | Age: 54
End: 2019-08-21

## 2020-10-06 ENCOUNTER — PATIENT MESSAGE (OUTPATIENT)
Dept: ADMINISTRATIVE | Facility: HOSPITAL | Age: 55
End: 2020-10-06

## 2021-03-25 ENCOUNTER — PATIENT MESSAGE (OUTPATIENT)
Dept: ADMINISTRATIVE | Facility: HOSPITAL | Age: 56
End: 2021-03-25

## 2021-09-03 ENCOUNTER — PATIENT OUTREACH (OUTPATIENT)
Dept: ADMINISTRATIVE | Facility: HOSPITAL | Age: 56
End: 2021-09-03

## 2021-09-03 ENCOUNTER — HOSPITAL ENCOUNTER (OUTPATIENT)
Dept: RADIOLOGY | Facility: HOSPITAL | Age: 56
Discharge: HOME | End: 2021-09-03
Attending: NURSE PRACTITIONER
Payer: MEDICARE

## 2021-09-03 ENCOUNTER — TELEPHONE (OUTPATIENT)
Dept: INTERNAL MEDICINE | Facility: CLINIC | Age: 56
End: 2021-09-03

## 2021-09-03 ENCOUNTER — OFFICE VISIT (OUTPATIENT)
Dept: INTERNAL MEDICINE | Facility: CLINIC | Age: 56
End: 2021-09-03
Payer: MEDICARE

## 2021-09-03 VITALS
HEIGHT: 74 IN | TEMPERATURE: 98 F | SYSTOLIC BLOOD PRESSURE: 132 MMHG | HEART RATE: 71 BPM | DIASTOLIC BLOOD PRESSURE: 92 MMHG | WEIGHT: 230.38 LBS | OXYGEN SATURATION: 98 % | BODY MASS INDEX: 29.57 KG/M2
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
## 2021-09-03 DIAGNOSIS — R11.0 NAUSEA: ICD-10-CM

## 2021-09-03 DIAGNOSIS — R07.9 RIGHT-SIDED CHEST PAIN: ICD-10-CM

## 2021-09-03 DIAGNOSIS — R07.81 RIB PAIN ON RIGHT SIDE: ICD-10-CM

## 2021-09-03 DIAGNOSIS — Z87.81 HISTORY OF FRACTURED RIB: Primary | ICD-10-CM

## 2021-09-03 DIAGNOSIS — Z87.81 HISTORY OF FRACTURED RIB: ICD-10-CM

## 2021-09-03 PROCEDURE — 3075F PR MOST RECENT SYSTOLIC BLOOD PRESS GE 130-139MM HG: ICD-10-PCS | Mod: S$GLB,,, | Performed by: NURSE PRACTITIONER

## 2021-09-03 PROCEDURE — 99214 OFFICE O/P EST MOD 30 MIN: CPT | Mod: S$GLB,,, | Performed by: NURSE PRACTITIONER

## 2021-09-03 PROCEDURE — 1159F MED LIST DOCD IN RCRD: CPT | Mod: S$GLB,,, | Performed by: NURSE PRACTITIONER

## 2021-09-03 PROCEDURE — 71046 X-RAY EXAM CHEST 2 VIEWS: CPT | Mod: TC,PO

## 2021-09-03 PROCEDURE — 1160F RVW MEDS BY RX/DR IN RCRD: CPT | Mod: S$GLB,,, | Performed by: NURSE PRACTITIONER

## 2021-09-03 PROCEDURE — 1160F PR REVIEW ALL MEDS BY PRESCRIBER/CLIN PHARMACIST DOCUMENTED: ICD-10-PCS | Mod: S$GLB,,, | Performed by: NURSE PRACTITIONER

## 2021-09-03 PROCEDURE — 71046 XR CHEST PA AND LATERAL: ICD-10-PCS | Mod: 26,,, | Performed by: RADIOLOGY

## 2021-09-03 PROCEDURE — 99999 PR PBB SHADOW E&M-EST. PATIENT-LVL IV: ICD-10-PCS | Mod: PBBFAC,,, | Performed by: NURSE PRACTITIONER

## 2021-09-03 PROCEDURE — 3008F PR BODY MASS INDEX (BMI) DOCUMENTED: ICD-10-PCS | Mod: S$GLB,,, | Performed by: NURSE PRACTITIONER

## 2021-09-03 PROCEDURE — 99214 PR OFFICE/OUTPT VISIT, EST, LEVL IV, 30-39 MIN: ICD-10-PCS | Mod: S$GLB,,, | Performed by: NURSE PRACTITIONER

## 2021-09-03 PROCEDURE — 71046 X-RAY EXAM CHEST 2 VIEWS: CPT | Mod: 26,,, | Performed by: RADIOLOGY

## 2021-09-03 PROCEDURE — 3080F PR MOST RECENT DIASTOLIC BLOOD PRESSURE >= 90 MM HG: ICD-10-PCS | Mod: S$GLB,,, | Performed by: NURSE PRACTITIONER

## 2021-09-03 PROCEDURE — 1159F PR MEDICATION LIST DOCUMENTED IN MEDICAL RECORD: ICD-10-PCS | Mod: S$GLB,,, | Performed by: NURSE PRACTITIONER

## 2021-09-03 PROCEDURE — 3008F BODY MASS INDEX DOCD: CPT | Mod: S$GLB,,, | Performed by: NURSE PRACTITIONER

## 2021-09-03 PROCEDURE — 3075F SYST BP GE 130 - 139MM HG: CPT | Mod: S$GLB,,, | Performed by: NURSE PRACTITIONER

## 2021-09-03 PROCEDURE — 99999 PR PBB SHADOW E&M-EST. PATIENT-LVL IV: CPT | Mod: PBBFAC,,, | Performed by: NURSE PRACTITIONER

## 2021-09-03 PROCEDURE — 76705 ECHO EXAM OF ABDOMEN: CPT | Mod: TC

## 2021-09-03 PROCEDURE — 3080F DIAST BP >= 90 MM HG: CPT | Mod: S$GLB,,, | Performed by: NURSE PRACTITIONER

## 2021-09-03 PROCEDURE — 76705 ECHO EXAM OF ABDOMEN: CPT | Mod: 26,,, | Performed by: RADIOLOGY

## 2021-09-03 PROCEDURE — 76705 US ABDOMEN LIMITED: ICD-10-PCS | Mod: 26,,, | Performed by: RADIOLOGY

## 2021-09-03 RX ORDER — LORAZEPAM 1 MG/1
0.5 TABLET ORAL DAILY PRN
COMMUNITY
Start: 2021-07-15

## 2021-09-03 RX ORDER — QUETIAPINE FUMARATE 200 MG/1
200-300 TABLET, FILM COATED ORAL NIGHTLY
COMMUNITY
Start: 2021-08-26

## 2021-09-03 RX ORDER — PANTOPRAZOLE SODIUM 40 MG/1
40 TABLET, DELAYED RELEASE ORAL DAILY
COMMUNITY
Start: 2021-08-24

## 2021-09-03 RX ORDER — ALIROCUMAB 75 MG/ML
INJECTION, SOLUTION SUBCUTANEOUS
COMMUNITY
Start: 2021-06-11

## 2021-09-03 RX ORDER — ONDANSETRON 4 MG/1
4 TABLET, FILM COATED ORAL EVERY 8 HOURS PRN
COMMUNITY

## 2021-09-03 RX ORDER — HYDROCODONE BITARTRATE AND ACETAMINOPHEN 7.5; 325 MG/1; MG/1
1 TABLET ORAL EVERY 8 HOURS PRN
Qty: 15 TABLET | Refills: 0 | Status: SHIPPED | OUTPATIENT
Start: 2021-09-03

## 2021-09-03 RX ORDER — FLUOXETINE 10 MG/1
10 CAPSULE ORAL DAILY
COMMUNITY
Start: 2021-07-12

## 2021-09-03 RX ORDER — OLOPATADINE HYDROCHLORIDE 1 MG/ML
1 SOLUTION/ DROPS OPHTHALMIC 2 TIMES DAILY
COMMUNITY
Start: 2021-07-16

## 2021-09-03 RX ORDER — EZETIMIBE 10 MG/1
10 TABLET ORAL
COMMUNITY

## 2021-09-03 RX ORDER — HYDROCODONE BITARTRATE AND ACETAMINOPHEN 7.5; 325 MG/1; MG/1
1 TABLET ORAL EVERY 8 HOURS PRN
COMMUNITY
Start: 2021-08-31 | End: 2021-09-03 | Stop reason: SDUPTHER

## 2021-09-04 ENCOUNTER — HOSPITAL ENCOUNTER (EMERGENCY)
Facility: HOSPITAL | Age: 56
Discharge: HOME | End: 2021-09-04
Attending: EMERGENCY MEDICINE
Payer: MEDICARE

## 2021-09-04 VITALS
DIASTOLIC BLOOD PRESSURE: 93 MMHG | HEIGHT: 74 IN | HEART RATE: 67 BPM | WEIGHT: 230 LBS | RESPIRATION RATE: 17 BRPM | BODY MASS INDEX: 29.52 KG/M2 | TEMPERATURE: 98 F | OXYGEN SATURATION: 96 % | SYSTOLIC BLOOD PRESSURE: 146 MMHG

## 2021-09-04 DIAGNOSIS — R10.11 RUQ PAIN: ICD-10-CM

## 2021-09-04 LAB
ALBUMIN SERPL BCP-MCNC: 4 G/DL (ref 3.5–5.2)
ALP SERPL-CCNC: 80 U/L (ref 55–135)
ALT SERPL W/O P-5'-P-CCNC: 28 U/L (ref 10–44)
AMYLASE SERPL-CCNC: 97 U/L (ref 20–110)
ANION GAP SERPL CALC-SCNC: 11 MMOL/L (ref 8–16)
AST SERPL-CCNC: 38 U/L (ref 10–40)
BASOPHILS # BLD AUTO: 0.04 K/UL (ref 0–0.2)
BASOPHILS NFR BLD: 0.9 % (ref 0–1.9)
BILIRUB SERPL-MCNC: 0.1 MG/DL (ref 0.1–1)
BILIRUB UR QL STRIP: NEGATIVE
BUN SERPL-MCNC: 11 MG/DL (ref 6–20)
CALCIUM SERPL-MCNC: 9.9 MG/DL (ref 8.7–10.5)
CHLORIDE SERPL-SCNC: 104 MMOL/L (ref 95–110)
CLARITY UR: CLEAR
CO2 SERPL-SCNC: 23 MMOL/L (ref 23–29)
COLOR UR: YELLOW
CREAT SERPL-MCNC: 1.1 MG/DL (ref 0.5–1.4)
DIFFERENTIAL METHOD: ABNORMAL
EOSINOPHIL # BLD AUTO: 0.2 K/UL (ref 0–0.5)
EOSINOPHIL NFR BLD: 5 % (ref 0–8)
ERYTHROCYTE [DISTWIDTH] IN BLOOD BY AUTOMATED COUNT: 12.3 % (ref 11.5–14.5)
EST. GFR  (AFRICAN AMERICAN): >60 ML/MIN/1.73 M^2
EST. GFR  (NON AFRICAN AMERICAN): >60 ML/MIN/1.73 M^2
GLUCOSE SERPL-MCNC: 74 MG/DL (ref 70–110)
GLUCOSE UR QL STRIP: NEGATIVE
HCT VFR BLD AUTO: 43 % (ref 40–54)
HGB BLD-MCNC: 14.6 G/DL (ref 14–18)
HGB UR QL STRIP: NEGATIVE
IMM GRANULOCYTES # BLD AUTO: 0.01 K/UL (ref 0–0.04)
IMM GRANULOCYTES NFR BLD AUTO: 0.2 % (ref 0–0.5)
KETONES UR QL STRIP: NEGATIVE
LEUKOCYTE ESTERASE UR QL STRIP: NEGATIVE
LIPASE SERPL-CCNC: 38 U/L (ref 4–60)
LYMPHOCYTES # BLD AUTO: 1.9 K/UL (ref 1–4.8)
LYMPHOCYTES NFR BLD: 41.7 % (ref 18–48)
MCH RBC QN AUTO: 30.3 PG (ref 27–31)
MCHC RBC AUTO-ENTMCNC: 34 G/DL (ref 32–36)
MCV RBC AUTO: 89 FL (ref 82–98)
MONOCYTES # BLD AUTO: 0.7 K/UL (ref 0.3–1)
MONOCYTES NFR BLD: 14.2 % (ref 4–15)
NEUTROPHILS # BLD AUTO: 1.7 K/UL (ref 1.8–7.7)
NEUTROPHILS NFR BLD: 38 % (ref 38–73)
NITRITE UR QL STRIP: NEGATIVE
NRBC BLD-RTO: 0 /100 WBC
PH UR STRIP: 6 [PH] (ref 5–8)
PLATELET # BLD AUTO: 185 K/UL (ref 150–450)
PMV BLD AUTO: 8.8 FL (ref 9.2–12.9)
POTASSIUM SERPL-SCNC: 4.6 MMOL/L (ref 3.5–5.1)
PROT SERPL-MCNC: 7.2 G/DL (ref 6–8.4)
PROT UR QL STRIP: NEGATIVE
RBC # BLD AUTO: 4.82 M/UL (ref 4.6–6.2)
SODIUM SERPL-SCNC: 138 MMOL/L (ref 136–145)
SP GR UR STRIP: 1.01 (ref 1–1.03)
TROPONIN I SERPL DL<=0.01 NG/ML-MCNC: <0.006 NG/ML (ref 0–0.03)
URN SPEC COLLECT METH UR: NORMAL
UROBILINOGEN UR STRIP-ACNC: NEGATIVE EU/DL
WBC # BLD AUTO: 4.58 K/UL (ref 3.9–12.7)

## 2021-09-04 PROCEDURE — 85025 COMPLETE CBC W/AUTO DIFF WBC: CPT | Performed by: NURSE PRACTITIONER

## 2021-09-04 PROCEDURE — 93010 ELECTROCARDIOGRAM REPORT: CPT | Mod: ,,, | Performed by: INTERNAL MEDICINE

## 2021-09-04 PROCEDURE — 84484 ASSAY OF TROPONIN QUANT: CPT | Performed by: EMERGENCY MEDICINE

## 2021-09-04 PROCEDURE — 80053 COMPREHEN METABOLIC PANEL: CPT | Performed by: NURSE PRACTITIONER

## 2021-09-04 PROCEDURE — 96361 HYDRATE IV INFUSION ADD-ON: CPT

## 2021-09-04 PROCEDURE — 93010 EKG 12-LEAD: ICD-10-PCS | Mod: ,,, | Performed by: INTERNAL MEDICINE

## 2021-09-04 PROCEDURE — 82150 ASSAY OF AMYLASE: CPT | Performed by: NURSE PRACTITIONER

## 2021-09-04 PROCEDURE — 93005 ELECTROCARDIOGRAM TRACING: CPT

## 2021-09-04 PROCEDURE — 25000003 PHARM REV CODE 250: Performed by: NURSE PRACTITIONER

## 2021-09-04 PROCEDURE — 63600175 PHARM REV CODE 636 W HCPCS: Performed by: NURSE PRACTITIONER

## 2021-09-04 PROCEDURE — 83690 ASSAY OF LIPASE: CPT | Performed by: NURSE PRACTITIONER

## 2021-09-04 PROCEDURE — 99284 EMERGENCY DEPT VISIT MOD MDM: CPT | Mod: 25

## 2021-09-04 PROCEDURE — 81003 URINALYSIS AUTO W/O SCOPE: CPT | Performed by: NURSE PRACTITIONER

## 2021-09-04 PROCEDURE — 96374 THER/PROPH/DIAG INJ IV PUSH: CPT

## 2021-09-04 PROCEDURE — 96375 TX/PRO/DX INJ NEW DRUG ADDON: CPT

## 2021-09-04 RX ORDER — ONDANSETRON 2 MG/ML
4 INJECTION INTRAMUSCULAR; INTRAVENOUS
Status: COMPLETED | OUTPATIENT
Start: 2021-09-04 | End: 2021-09-04

## 2021-09-04 RX ORDER — KETOROLAC TROMETHAMINE 30 MG/ML
15 INJECTION, SOLUTION INTRAMUSCULAR; INTRAVENOUS
Status: COMPLETED | OUTPATIENT
Start: 2021-09-04 | End: 2021-09-04

## 2021-09-04 RX ORDER — OXYCODONE AND ACETAMINOPHEN 7.5; 325 MG/1; MG/1
1 TABLET ORAL EVERY 6 HOURS PRN
Qty: 10 TABLET | Refills: 0 | Status: SHIPPED | OUTPATIENT
Start: 2021-09-04

## 2021-09-04 RX ORDER — MORPHINE SULFATE 4 MG/ML
4 INJECTION, SOLUTION INTRAMUSCULAR; INTRAVENOUS
Status: DISCONTINUED | OUTPATIENT
Start: 2021-09-04 | End: 2021-09-04 | Stop reason: HOSPADM

## 2021-09-04 RX ADMIN — SODIUM CHLORIDE 1000 ML: 0.9 INJECTION, SOLUTION INTRAVENOUS at 02:09

## 2021-09-04 RX ADMIN — KETOROLAC TROMETHAMINE 15 MG: 30 INJECTION, SOLUTION INTRAMUSCULAR; INTRAVENOUS at 02:09

## 2021-09-04 RX ADMIN — ONDANSETRON 4 MG: 2 INJECTION INTRAMUSCULAR; INTRAVENOUS at 02:09

## 2022-09-06 ENCOUNTER — PES CALL (OUTPATIENT)
Dept: ADMINISTRATIVE | Facility: CLINIC | Age: 57
End: 2022-09-06
Payer: MEDICARE

## 2022-10-21 ENCOUNTER — PATIENT OUTREACH (OUTPATIENT)
Dept: ADMINISTRATIVE | Facility: HOSPITAL | Age: 57
End: 2022-10-21
Payer: MEDICARE

## 2022-10-21 NOTE — PROGRESS NOTES
Working HTN report:     Called to discuss scheduling appointment and to get updated BP reading. Last vsist > thank 1 year ago. No answer, LVM

## 2023-03-30 ENCOUNTER — TELEPHONE (OUTPATIENT)
Dept: ADMINISTRATIVE | Facility: HOSPITAL | Age: 58
End: 2023-03-30
Payer: MEDICARE

## 2023-06-02 ENCOUNTER — PES CALL (OUTPATIENT)
Dept: ADMINISTRATIVE | Facility: CLINIC | Age: 58
End: 2023-06-02
Payer: MEDICARE

## 2023-09-07 ENCOUNTER — PATIENT MESSAGE (OUTPATIENT)
Dept: ADMINISTRATIVE | Facility: HOSPITAL | Age: 58
End: 2023-09-07
Payer: MEDICARE